# Patient Record
Sex: MALE
[De-identification: names, ages, dates, MRNs, and addresses within clinical notes are randomized per-mention and may not be internally consistent; named-entity substitution may affect disease eponyms.]

---

## 2020-09-15 ENCOUNTER — HOSPITAL ENCOUNTER (INPATIENT)
Dept: HOSPITAL 84 - D.ER | Age: 65
LOS: 14 days | Discharge: ANOTHER HOSPITAL | DRG: 871 | End: 2020-09-29
Attending: FAMILY MEDICINE | Admitting: FAMILY MEDICINE
Payer: COMMERCIAL

## 2020-09-15 VITALS — DIASTOLIC BLOOD PRESSURE: 85 MMHG | SYSTOLIC BLOOD PRESSURE: 124 MMHG

## 2020-09-15 VITALS — SYSTOLIC BLOOD PRESSURE: 144 MMHG | DIASTOLIC BLOOD PRESSURE: 82 MMHG

## 2020-09-15 VITALS — SYSTOLIC BLOOD PRESSURE: 131 MMHG | DIASTOLIC BLOOD PRESSURE: 78 MMHG

## 2020-09-15 VITALS — DIASTOLIC BLOOD PRESSURE: 75 MMHG | SYSTOLIC BLOOD PRESSURE: 125 MMHG

## 2020-09-15 VITALS — DIASTOLIC BLOOD PRESSURE: 82 MMHG | SYSTOLIC BLOOD PRESSURE: 133 MMHG

## 2020-09-15 VITALS — DIASTOLIC BLOOD PRESSURE: 84 MMHG | SYSTOLIC BLOOD PRESSURE: 138 MMHG

## 2020-09-15 VITALS — DIASTOLIC BLOOD PRESSURE: 85 MMHG | SYSTOLIC BLOOD PRESSURE: 133 MMHG

## 2020-09-15 VITALS
BODY MASS INDEX: 27.66 KG/M2 | BODY MASS INDEX: 27.66 KG/M2 | HEIGHT: 65 IN | HEIGHT: 65 IN | BODY MASS INDEX: 27.66 KG/M2 | BODY MASS INDEX: 27.66 KG/M2 | WEIGHT: 166 LBS | WEIGHT: 166 LBS

## 2020-09-15 VITALS — DIASTOLIC BLOOD PRESSURE: 86 MMHG | SYSTOLIC BLOOD PRESSURE: 128 MMHG

## 2020-09-15 DIAGNOSIS — D64.9: ICD-10-CM

## 2020-09-15 DIAGNOSIS — G93.41: ICD-10-CM

## 2020-09-15 DIAGNOSIS — U07.1: ICD-10-CM

## 2020-09-15 DIAGNOSIS — N40.0: ICD-10-CM

## 2020-09-15 DIAGNOSIS — I12.9: ICD-10-CM

## 2020-09-15 DIAGNOSIS — J45.909: ICD-10-CM

## 2020-09-15 DIAGNOSIS — E87.6: ICD-10-CM

## 2020-09-15 DIAGNOSIS — N39.0: ICD-10-CM

## 2020-09-15 DIAGNOSIS — A41.9: Primary | ICD-10-CM

## 2020-09-15 DIAGNOSIS — E86.0: ICD-10-CM

## 2020-09-15 DIAGNOSIS — N18.4: ICD-10-CM

## 2020-09-15 DIAGNOSIS — E87.1: ICD-10-CM

## 2020-09-15 DIAGNOSIS — E87.8: ICD-10-CM

## 2020-09-15 DIAGNOSIS — N13.9: ICD-10-CM

## 2020-09-15 DIAGNOSIS — R26.9: ICD-10-CM

## 2020-09-15 DIAGNOSIS — E87.2: ICD-10-CM

## 2020-09-15 DIAGNOSIS — E55.9: ICD-10-CM

## 2020-09-15 DIAGNOSIS — J18.9: ICD-10-CM

## 2020-09-15 DIAGNOSIS — D63.1: ICD-10-CM

## 2020-09-15 DIAGNOSIS — N17.0: ICD-10-CM

## 2020-09-15 DIAGNOSIS — R32: ICD-10-CM

## 2020-09-15 DIAGNOSIS — N13.30: ICD-10-CM

## 2020-09-15 LAB
ALBUMIN SERPL-MCNC: 2.5 G/DL (ref 3.4–5)
ALBUMIN SERPL-MCNC: 3.3 G/DL (ref 3.4–5)
ALP SERPL-CCNC: 102 U/L (ref 30–120)
ALP SERPL-CCNC: 77 U/L (ref 30–120)
ALT SERPL-CCNC: 20 U/L (ref 10–68)
ALT SERPL-CCNC: 28 U/L (ref 10–68)
AMORPHOUS SEDIMENT: (no result) /LPF
ANION GAP SERPL CALC-SCNC: 20.8 MMOL/L (ref 8–16)
ANION GAP SERPL CALC-SCNC: 22 MMOL/L (ref 8–16)
ANION GAP SERPL CALC-SCNC: 26.3 MMOL/L (ref 8–16)
APTT BLD: 34.1 SECONDS (ref 22.8–39.4)
BACTERIA #/AREA URNS HPF: (no result) /HPF
BASOPHILS NFR BLD AUTO: 0.1 % (ref 0–2)
BILIRUB SERPL-MCNC: 0.44 MG/DL (ref 0.2–1.3)
BILIRUB SERPL-MCNC: 0.57 MG/DL (ref 0.2–1.3)
BILIRUB SERPL-MCNC: NEGATIVE MG/DL
BUN SERPL-MCNC: 182 MG/DL (ref 7–18)
BUN SERPL-MCNC: 182 MG/DL (ref 7–18)
BUN SERPL-MCNC: 202 MG/DL (ref 7–18)
CALCIUM SERPL-MCNC: 7.6 MG/DL (ref 8.5–10.1)
CALCIUM SERPL-MCNC: 7.7 MG/DL (ref 8.5–10.1)
CALCIUM SERPL-MCNC: 8.4 MG/DL (ref 8.5–10.1)
CHLORIDE SERPL-SCNC: 101 MMOL/L (ref 98–107)
CHLORIDE SERPL-SCNC: 106 MMOL/L (ref 98–107)
CHLORIDE SERPL-SCNC: 106 MMOL/L (ref 98–107)
CK MB SERPL-MCNC: 10.7 U/L (ref 0–3.6)
CK SERPL-CCNC: 222 UL (ref 21–232)
CO2 SERPL-SCNC: 15.5 MMOL/L (ref 21–32)
CO2 SERPL-SCNC: 9.2 MMOL/L (ref 21–32)
CO2 SERPL-SCNC: 9.2 MMOL/L (ref 21–32)
CREAT SERPL-MCNC: 12.7 MG/DL (ref 0.6–1.3)
CREAT SERPL-MCNC: 13.3 MG/DL (ref 0.6–1.3)
CREAT SERPL-MCNC: 15.5 MG/DL (ref 0.6–1.3)
EOSINOPHIL NFR BLD: 0.5 % (ref 0–7)
ERYTHROCYTE [DISTWIDTH] IN BLOOD BY AUTOMATED COUNT: 13 % (ref 11.5–14.5)
GLOBULIN SER-MCNC: 4.1 G/L
GLOBULIN SER-MCNC: 5.3 G/L
GLUCOSE SERPL-MCNC: 102 MG/DL (ref 74–106)
GLUCOSE SERPL-MCNC: 117 MG/DL (ref 74–106)
GLUCOSE SERPL-MCNC: 92 MG/DL (ref 74–106)
HCT VFR BLD CALC: 34.2 % (ref 42–54)
HGB BLD-MCNC: 11.2 G/DL (ref 13.5–17.5)
IMM GRANULOCYTES NFR BLD: 0.4 % (ref 0–5)
INR PPP: 1.22 (ref 0.85–1.17)
KETONES UR STRIP-MCNC: NEGATIVE MG/DL
LYMPHOCYTES NFR BLD AUTO: 5.9 % (ref 15–50)
MCH RBC QN AUTO: 29.6 PG (ref 26–34)
MCHC RBC AUTO-ENTMCNC: 32.7 G/DL (ref 31–37)
MCV RBC: 90.5 FL (ref 80–100)
MONOCYTES NFR BLD: 9.2 % (ref 2–11)
NEUTROPHILS NFR BLD AUTO: 83.9 % (ref 40–80)
NITRITE UR-MCNC: NEGATIVE MG/ML
NT-PROBNP SERPL-MCNC: 868 PG/ML (ref 0–125)
OSMOLALITY SERPL CALC.SUM OF ELEC: 326 MOSM/KG (ref 275–300)
OSMOLALITY SERPL CALC.SUM OF ELEC: 333 MOSM/KG (ref 275–300)
OSMOLALITY SERPL CALC.SUM OF ELEC: 338 MOSM/KG (ref 275–300)
PH UR STRIP: 8 [PH] (ref 5–8)
PLATELET # BLD: 267 10X3/UL (ref 130–400)
PMV BLD AUTO: 10.2 FL (ref 7.4–10.4)
POTASSIUM SERPL-SCNC: 3.3 MMOL/L (ref 3.5–5.1)
POTASSIUM SERPL-SCNC: 4.2 MMOL/L (ref 3.5–5.1)
POTASSIUM SERPL-SCNC: 4.5 MMOL/L (ref 3.5–5.1)
PROT SERPL-MCNC: 6.6 G/DL (ref 6.4–8.2)
PROT SERPL-MCNC: 8.6 G/DL (ref 6.4–8.2)
PROTHROMBIN TIME: 15.3 SECONDS (ref 11.6–15)
RBC # BLD AUTO: 3.78 10X6/UL (ref 4.2–6.1)
SODIUM SERPL-SCNC: 132 MMOL/L (ref 136–145)
SODIUM SERPL-SCNC: 133 MMOL/L (ref 136–145)
SODIUM SERPL-SCNC: 139 MMOL/L (ref 136–145)
SQUAMOUS #/AREA URNS HPF: (no result) /HPF (ref 0–5)
TROPONIN I SERPL-MCNC: 0.05 NG/ML (ref 0–0.06)
UROBILINOGEN UR-MCNC: NORMAL MG/DL (ref ?–2)
WBC # BLD AUTO: 7.5 10X3/UL (ref 4.8–10.8)
WBC #/AREA URNS HPF: (no result) HPF (ref 0–1)

## 2020-09-15 NOTE — NUR
pt provided a urinal and informed that we needed to get a urine sample, pt
states that he can not give a urine at this time, Dr. Street updated

## 2020-09-15 NOTE — NUR
ARRIVE TO ROOM VIA BED FROM ER. ALERT AND ORIENTED X4. QUIROGA DRAINING BY
GRAVITY. VITALS STABLE. SINUS TACH ON TELEMETRY.  ARRIVES TO ROOM.
CONTINUE ADMISSION PROCESS AND SAFETY PRECAUTIONS. SCDs ON.

## 2020-09-15 NOTE — NUR
REPORT RECEIVED, WILL CONT POC. PT A&O, UP IN BED RESTING. NO S/S OF DISTRESS
OBSERVED. RR EVEN & UNLABORED AND O2 SAT 97% ON 2L VIA NC. PT REQUESTS WATER
AND JELLO, BROUGHT TO BEDSIDE. PT DENIES FURTER NEEDS AT THIS TIME. BED LOCKED
AND LOWERED, CALL LIGHT IN REACH. INSTRUCTED PT TO CALL NURSES STATION IF PT
FEELS SOB OR HAS DIFFICULTY BREATHING. WROTE NURSES STATION NUMBER ON BOARD.
VERIFIED PTS UNDERSTANDING OF SYMPTOMS TO REPORT VIA TEACHBACK METHOD.
ASSESSMENT COMPLETED AT THIS TIME. WILL CONT TO MONITOR.

## 2020-09-16 VITALS — DIASTOLIC BLOOD PRESSURE: 79 MMHG | SYSTOLIC BLOOD PRESSURE: 129 MMHG

## 2020-09-16 VITALS — SYSTOLIC BLOOD PRESSURE: 128 MMHG | DIASTOLIC BLOOD PRESSURE: 87 MMHG

## 2020-09-16 VITALS — DIASTOLIC BLOOD PRESSURE: 86 MMHG | SYSTOLIC BLOOD PRESSURE: 129 MMHG

## 2020-09-16 VITALS — SYSTOLIC BLOOD PRESSURE: 133 MMHG | DIASTOLIC BLOOD PRESSURE: 86 MMHG

## 2020-09-16 VITALS — DIASTOLIC BLOOD PRESSURE: 80 MMHG | SYSTOLIC BLOOD PRESSURE: 133 MMHG

## 2020-09-16 VITALS — DIASTOLIC BLOOD PRESSURE: 100 MMHG | SYSTOLIC BLOOD PRESSURE: 148 MMHG

## 2020-09-16 VITALS — SYSTOLIC BLOOD PRESSURE: 127 MMHG | DIASTOLIC BLOOD PRESSURE: 88 MMHG

## 2020-09-16 VITALS — SYSTOLIC BLOOD PRESSURE: 131 MMHG | DIASTOLIC BLOOD PRESSURE: 93 MMHG

## 2020-09-16 VITALS — SYSTOLIC BLOOD PRESSURE: 136 MMHG | DIASTOLIC BLOOD PRESSURE: 79 MMHG

## 2020-09-16 VITALS — SYSTOLIC BLOOD PRESSURE: 142 MMHG | DIASTOLIC BLOOD PRESSURE: 93 MMHG

## 2020-09-16 VITALS — SYSTOLIC BLOOD PRESSURE: 133 MMHG | DIASTOLIC BLOOD PRESSURE: 84 MMHG

## 2020-09-16 VITALS — DIASTOLIC BLOOD PRESSURE: 88 MMHG | SYSTOLIC BLOOD PRESSURE: 130 MMHG

## 2020-09-16 VITALS — SYSTOLIC BLOOD PRESSURE: 127 MMHG | DIASTOLIC BLOOD PRESSURE: 90 MMHG

## 2020-09-16 LAB
ANION GAP SERPL CALC-SCNC: 18 MMOL/L (ref 8–16)
BASOPHILS NFR BLD AUTO: 0.2 % (ref 0–2)
BUN SERPL-MCNC: 178 MG/DL (ref 7–18)
CALCIUM SERPL-MCNC: 7.5 MG/DL (ref 8.5–10.1)
CHLORIDE SERPL-SCNC: 105 MMOL/L (ref 98–107)
CK MB SERPL-MCNC: 8.6 U/L (ref 0–3.6)
CK SERPL-CCNC: 219 UL (ref 21–232)
CO2 SERPL-SCNC: 17.1 MMOL/L (ref 21–32)
CREAT SERPL-MCNC: 12.2 MG/DL (ref 0.6–1.3)
CRP SERPL-MCNC: 3 MG/DL (ref 0–0.9)
EOSINOPHIL NFR BLD: 0.6 % (ref 0–7)
ERYTHROCYTE [DISTWIDTH] IN BLOOD BY AUTOMATED COUNT: 12.7 % (ref 11.5–14.5)
FERRITIN SERPL-MCNC: 1615 NG/ML (ref 3–244)
GLUCOSE SERPL-MCNC: 128 MG/DL (ref 74–106)
HCT VFR BLD CALC: 28.8 % (ref 42–54)
HGB BLD-MCNC: 9.9 G/DL (ref 13.5–17.5)
IMM GRANULOCYTES NFR BLD: 0.4 % (ref 0–5)
LDH1 SERPL-CCNC: 200 U/L (ref 85–227)
LYMPHOCYTES NFR BLD AUTO: 11.2 % (ref 15–50)
MAGNESIUM SERPL-MCNC: 1.7 MG/DL (ref 1.8–2.4)
MCH RBC QN AUTO: 30.1 PG (ref 26–34)
MCHC RBC AUTO-ENTMCNC: 34.4 G/DL (ref 31–37)
MCV RBC: 87.5 FL (ref 80–100)
MONOCYTES NFR BLD: 12.6 % (ref 2–11)
NEUTROPHILS NFR BLD AUTO: 75 % (ref 40–80)
OSMOLALITY SERPL CALC.SUM OF ELEC: 334 MOSM/KG (ref 275–300)
PHOSPHATE SERPL-MCNC: 7.3 MG/DL (ref 2.5–4.9)
PLATELET # BLD: 210 10X3/UL (ref 130–400)
PMV BLD AUTO: 9.6 FL (ref 7.4–10.4)
POTASSIUM SERPL-SCNC: 3.1 MMOL/L (ref 3.5–5.1)
RBC # BLD AUTO: 3.29 10X6/UL (ref 4.2–6.1)
SODIUM SERPL-SCNC: 137 MMOL/L (ref 136–145)
TROPONIN I SERPL-MCNC: 0.06 NG/ML (ref 0–0.06)
WBC # BLD AUTO: 5.3 10X3/UL (ref 4.8–10.8)

## 2020-09-16 PROCEDURE — 05HM33Z INSERTION OF INFUSION DEVICE INTO RIGHT INTERNAL JUGULAR VEIN, PERCUTANEOUS APPROACH: ICD-10-PCS | Performed by: SURGERY

## 2020-09-16 NOTE — NUR
PT REPORT RECEIVED FROM NIGHT SHIFT NURSE. NO ACUTE SIGNS OF DISTRESS NOTED.
SHIFT ASSESSMENT COMPLETED. WILL CONTINUE TO MONITOR

## 2020-09-16 NOTE — NUR
REPORT RECEIVED. PT SITTING IN BED, NO ACUTE DISTRESS NOTED. PT ON 3L NC,
ASSESSMENT COMPLETED, SEE FLOWSHEET. RT TRIALYSIS IJ INFUSING, SEE IV
FLOWSHEET. WILL CONTINUE TO MONITOR.

## 2020-09-17 VITALS — SYSTOLIC BLOOD PRESSURE: 129 MMHG | DIASTOLIC BLOOD PRESSURE: 80 MMHG

## 2020-09-17 VITALS — SYSTOLIC BLOOD PRESSURE: 136 MMHG | DIASTOLIC BLOOD PRESSURE: 95 MMHG

## 2020-09-17 VITALS — SYSTOLIC BLOOD PRESSURE: 120 MMHG | DIASTOLIC BLOOD PRESSURE: 80 MMHG

## 2020-09-17 VITALS — SYSTOLIC BLOOD PRESSURE: 129 MMHG | DIASTOLIC BLOOD PRESSURE: 82 MMHG

## 2020-09-17 VITALS — DIASTOLIC BLOOD PRESSURE: 73 MMHG | SYSTOLIC BLOOD PRESSURE: 121 MMHG

## 2020-09-17 VITALS — SYSTOLIC BLOOD PRESSURE: 125 MMHG | DIASTOLIC BLOOD PRESSURE: 83 MMHG

## 2020-09-17 VITALS — DIASTOLIC BLOOD PRESSURE: 89 MMHG | SYSTOLIC BLOOD PRESSURE: 133 MMHG

## 2020-09-17 VITALS — DIASTOLIC BLOOD PRESSURE: 86 MMHG | SYSTOLIC BLOOD PRESSURE: 132 MMHG

## 2020-09-17 VITALS — DIASTOLIC BLOOD PRESSURE: 77 MMHG | SYSTOLIC BLOOD PRESSURE: 119 MMHG

## 2020-09-17 VITALS — SYSTOLIC BLOOD PRESSURE: 152 MMHG | DIASTOLIC BLOOD PRESSURE: 92 MMHG

## 2020-09-17 VITALS — SYSTOLIC BLOOD PRESSURE: 120 MMHG | DIASTOLIC BLOOD PRESSURE: 75 MMHG

## 2020-09-17 VITALS — SYSTOLIC BLOOD PRESSURE: 122 MMHG | DIASTOLIC BLOOD PRESSURE: 83 MMHG

## 2020-09-17 VITALS — DIASTOLIC BLOOD PRESSURE: 80 MMHG | SYSTOLIC BLOOD PRESSURE: 119 MMHG

## 2020-09-17 VITALS — SYSTOLIC BLOOD PRESSURE: 154 MMHG | DIASTOLIC BLOOD PRESSURE: 93 MMHG

## 2020-09-17 VITALS — DIASTOLIC BLOOD PRESSURE: 77 MMHG | SYSTOLIC BLOOD PRESSURE: 136 MMHG

## 2020-09-17 VITALS — SYSTOLIC BLOOD PRESSURE: 151 MMHG | DIASTOLIC BLOOD PRESSURE: 90 MMHG

## 2020-09-17 VITALS — DIASTOLIC BLOOD PRESSURE: 80 MMHG | SYSTOLIC BLOOD PRESSURE: 126 MMHG

## 2020-09-17 VITALS — DIASTOLIC BLOOD PRESSURE: 95 MMHG | SYSTOLIC BLOOD PRESSURE: 149 MMHG

## 2020-09-17 VITALS — DIASTOLIC BLOOD PRESSURE: 90 MMHG | SYSTOLIC BLOOD PRESSURE: 131 MMHG

## 2020-09-17 LAB
ALBUMIN SERPL-MCNC: 2.6 G/DL (ref 3.4–5)
ALP SERPL-CCNC: 76 U/L (ref 30–120)
ALT SERPL-CCNC: 19 U/L (ref 10–68)
ANION GAP SERPL CALC-SCNC: 10 MMOL/L (ref 8–16)
ANION GAP SERPL CALC-SCNC: 7.7 MMOL/L (ref 8–16)
BASOPHILS NFR BLD AUTO: 0 % (ref 0–2)
BILIRUB SERPL-MCNC: 0.52 MG/DL (ref 0.2–1.3)
BUN SERPL-MCNC: 72 MG/DL (ref 7–18)
BUN SERPL-MCNC: 77 MG/DL (ref 7–18)
CALCIUM SERPL-MCNC: 7.1 MG/DL (ref 8.5–10.1)
CALCIUM SERPL-MCNC: 7.2 MG/DL (ref 8.5–10.1)
CHLORIDE SERPL-SCNC: 100 MMOL/L (ref 98–107)
CHLORIDE SERPL-SCNC: 97 MMOL/L (ref 98–107)
CK MB SERPL-MCNC: 4.1 U/L (ref 0–3.6)
CK MB SERPL-MCNC: 4.5 U/L (ref 0–3.6)
CK SERPL-CCNC: 261 UL (ref 21–232)
CK SERPL-CCNC: 285 UL (ref 21–232)
CO2 SERPL-SCNC: 33.6 MMOL/L (ref 21–32)
CO2 SERPL-SCNC: 38.1 MMOL/L (ref 21–32)
CREAT SERPL-MCNC: 6.3 MG/DL (ref 0.6–1.3)
CREAT SERPL-MCNC: 6.5 MG/DL (ref 0.6–1.3)
EOSINOPHIL NFR BLD: 0.1 % (ref 0–7)
ERYTHROCYTE [DISTWIDTH] IN BLOOD BY AUTOMATED COUNT: 12.4 % (ref 11.5–14.5)
GLOBULIN SER-MCNC: 4.2 G/L
GLUCOSE SERPL-MCNC: 135 MG/DL (ref 74–106)
GLUCOSE SERPL-MCNC: 185 MG/DL (ref 74–106)
HCT VFR BLD CALC: 27.4 % (ref 42–54)
HGB BLD-MCNC: 9.1 G/DL (ref 13.5–17.5)
IMM GRANULOCYTES NFR BLD: 0.3 % (ref 0–5)
LYMPHOCYTES NFR BLD AUTO: 13.4 % (ref 15–50)
MAGNESIUM SERPL-MCNC: 1.7 MG/DL (ref 1.8–2.4)
MCH RBC QN AUTO: 29.5 PG (ref 26–34)
MCHC RBC AUTO-ENTMCNC: 33.2 G/DL (ref 31–37)
MCV RBC: 89 FL (ref 80–100)
MONOCYTES NFR BLD: 4.2 % (ref 2–11)
NEUTROPHILS NFR BLD AUTO: 82 % (ref 40–80)
OSMOLALITY SERPL CALC.SUM OF ELEC: 304 MOSM/KG (ref 275–300)
OSMOLALITY SERPL CALC.SUM OF ELEC: 305 MOSM/KG (ref 275–300)
PHOSPHATE SERPL-MCNC: 4.1 MG/DL (ref 2.5–4.9)
PLATELET # BLD: 174 10X3/UL (ref 130–400)
PMV BLD AUTO: 10.3 FL (ref 7.4–10.4)
POTASSIUM SERPL-SCNC: 2.6 MMOL/L (ref 3.5–5.1)
POTASSIUM SERPL-SCNC: 2.8 MMOL/L (ref 3.5–5.1)
PROT SERPL-MCNC: 6.8 G/DL (ref 6.4–8.2)
RBC # BLD AUTO: 3.08 10X6/UL (ref 4.2–6.1)
SODIUM SERPL-SCNC: 140 MMOL/L (ref 136–145)
SODIUM SERPL-SCNC: 141 MMOL/L (ref 136–145)
TROPONIN I SERPL-MCNC: 0.04 NG/ML (ref 0–0.06)
TROPONIN I SERPL-MCNC: 0.04 NG/ML (ref 0–0.06)
WBC # BLD AUTO: 7.2 10X3/UL (ref 4.8–10.8)

## 2020-09-17 NOTE — NUR
RECEIVED PATIENT TO ROOM 2140 VIA BED. PATIENT IS AAOX4, LYING IN SEMI-FOWLERS
POSITION. TRIALYSIS TO RT IJ, PATENT, INFUSING LR @ 100ML/HR. NO S/S OF
DISTRESS OBSERVED, RR EVEN AND UNLABORED ON 1L O2 VIA NC. F/C PATENT, DRAINING
YELLOW URINE BY GRAVITY TO RT SIDE OF BED. PATIENT DENIES NEEDS AT THIS TIME.
CL IN REACH, BED LOCKED AND LOWERED. COVID DROPET PRECAUTIONS MAINTAINED. WILL
CTM.

## 2020-09-18 VITALS — SYSTOLIC BLOOD PRESSURE: 124 MMHG | DIASTOLIC BLOOD PRESSURE: 81 MMHG

## 2020-09-18 VITALS — SYSTOLIC BLOOD PRESSURE: 125 MMHG | DIASTOLIC BLOOD PRESSURE: 79 MMHG

## 2020-09-18 VITALS — DIASTOLIC BLOOD PRESSURE: 80 MMHG | SYSTOLIC BLOOD PRESSURE: 131 MMHG

## 2020-09-18 VITALS — DIASTOLIC BLOOD PRESSURE: 89 MMHG | SYSTOLIC BLOOD PRESSURE: 132 MMHG

## 2020-09-18 VITALS — DIASTOLIC BLOOD PRESSURE: 83 MMHG | SYSTOLIC BLOOD PRESSURE: 122 MMHG

## 2020-09-18 LAB
ALBUMIN SERPL-MCNC: 2.5 G/DL (ref 3.4–5)
ALP SERPL-CCNC: 73 U/L (ref 30–120)
ALT SERPL-CCNC: 18 U/L (ref 10–68)
ANION GAP SERPL CALC-SCNC: 12.2 MMOL/L (ref 8–16)
BASOPHILS NFR BLD AUTO: 0.1 % (ref 0–2)
BILIRUB SERPL-MCNC: 0.38 MG/DL (ref 0.2–1.3)
BUN SERPL-MCNC: 67 MG/DL (ref 7–18)
CALCIUM SERPL-MCNC: 7.5 MG/DL (ref 8.5–10.1)
CHLORIDE SERPL-SCNC: 100 MMOL/L (ref 98–107)
CO2 SERPL-SCNC: 30.9 MMOL/L (ref 21–32)
CREAT SERPL-MCNC: 6.1 MG/DL (ref 0.6–1.3)
EOSINOPHIL NFR BLD: 0.1 % (ref 0–7)
ERYTHROCYTE [DISTWIDTH] IN BLOOD BY AUTOMATED COUNT: 12.3 % (ref 11.5–14.5)
GLOBULIN SER-MCNC: 4.1 G/L
GLUCOSE SERPL-MCNC: 104 MG/DL (ref 74–106)
HCT VFR BLD CALC: 27.8 % (ref 42–54)
HCV AB S/CO SERPL IA: 0.2 S/CO RAT (ref 0–0.9)
HGB BLD-MCNC: 8.8 G/DL (ref 13.5–17.5)
IMM GRANULOCYTES NFR BLD: 0.4 % (ref 0–5)
LYMPHOCYTES NFR BLD AUTO: 11.4 % (ref 15–50)
MAGNESIUM SERPL-MCNC: 2 MG/DL (ref 1.8–2.4)
MCH RBC QN AUTO: 29.5 PG (ref 26–34)
MCHC RBC AUTO-ENTMCNC: 31.7 G/DL (ref 31–37)
MCV RBC: 93.3 FL (ref 80–100)
MONOCYTES NFR BLD: 2.5 % (ref 2–11)
NEUTROPHILS NFR BLD AUTO: 85.5 % (ref 40–80)
OSMOLALITY SERPL CALC.SUM OF ELEC: 297 MOSM/KG (ref 275–300)
PHOSPHATE SERPL-MCNC: 5.3 MG/DL (ref 2.5–4.9)
PLATELET # BLD: 165 10X3/UL (ref 130–400)
PMV BLD AUTO: 10.2 FL (ref 7.4–10.4)
POTASSIUM SERPL-SCNC: 3.1 MMOL/L (ref 3.5–5.1)
PROT SERPL-MCNC: 6.6 G/DL (ref 6.4–8.2)
RBC # BLD AUTO: 2.98 10X6/UL (ref 4.2–6.1)
SODIUM SERPL-SCNC: 140 MMOL/L (ref 136–145)
WBC # BLD AUTO: 11.1 10X3/UL (ref 4.8–10.8)

## 2020-09-18 NOTE — NUR
RECEIVE SHIFT REPORT. RESTING IN BED WITH EYES CLOSED. NO SIGNS OF DISTRESS.
WILL CONTINUE PLAN OF CARE AND SAFETY PRECAUTIONS. MONITOR PLACED AT DESK FOR
SAFETY.

## 2020-09-18 NOTE — NUR
NUTRITION FOLLOW UP:
 
COMMENTS: Patient interview deferred due to COVID-19. Patient has had a PO
intake of 0% for 3 meals recorded. Patient has been experiencing a
poor appetite and decreased sense of taste and smell per MD note.
 
DIET: Renal Diet
PO INTAKE: 0% x 3 meals
WEIGHT: 9/16- 166 lbs
BM: None since admit
 
SIG MEDS: Zinc Sulfate, Pepcid, Lovenox, Vit D, Vit C, Zofran, LR @ 100 ml/hr
SIG LABS: K-3.1(L), GFR-10(L), BUN-67(H), Cr-6.1(H), Calcium-7.5(L),
Phos-5.3(H), Albumin-2.5(L), Vit D-12.9(L),
 
RECOMMENDATIONS:
Continue Renal Diet as tolerated
Encourage PO Intake
Assistance with meals as needed
Frequent breaks during meals if difficulty breathing
 
RD to continue to monitor and follow patient DHS

## 2020-09-19 VITALS — SYSTOLIC BLOOD PRESSURE: 136 MMHG | DIASTOLIC BLOOD PRESSURE: 82 MMHG

## 2020-09-19 VITALS — SYSTOLIC BLOOD PRESSURE: 113 MMHG | DIASTOLIC BLOOD PRESSURE: 82 MMHG

## 2020-09-19 VITALS — DIASTOLIC BLOOD PRESSURE: 84 MMHG | SYSTOLIC BLOOD PRESSURE: 123 MMHG

## 2020-09-19 VITALS — SYSTOLIC BLOOD PRESSURE: 120 MMHG | DIASTOLIC BLOOD PRESSURE: 80 MMHG

## 2020-09-19 LAB
AMORPHOUS SEDIMENT: (no result) /LPF
ANION GAP SERPL CALC-SCNC: 12.5 MMOL/L (ref 8–16)
BACTERIA #/AREA URNS HPF: (no result) HPF
BASOPHILS NFR BLD AUTO: 0 % (ref 0–2)
BILIRUB SERPL-MCNC: NEGATIVE MG/DL
BUN SERPL-MCNC: 62 MG/DL (ref 7–18)
CALCIUM SERPL-MCNC: 7.9 MG/DL (ref 8.5–10.1)
CHLORIDE SERPL-SCNC: 102 MMOL/L (ref 98–107)
CO2 SERPL-SCNC: 28.8 MMOL/L (ref 21–32)
CREAT SERPL-MCNC: 6.1 MG/DL (ref 0.6–1.3)
EOSINOPHIL NFR BLD: 0.2 % (ref 0–7)
ERYTHROCYTE [DISTWIDTH] IN BLOOD BY AUTOMATED COUNT: 12.4 % (ref 11.5–14.5)
GLUCOSE SERPL-MCNC: 88 MG/DL (ref 74–106)
HCT VFR BLD CALC: 29.9 % (ref 42–54)
HGB BLD-MCNC: 9.1 G/DL (ref 13.5–17.5)
IMM GRANULOCYTES NFR BLD: 0.4 % (ref 0–5)
KETONES UR STRIP-MCNC: NEGATIVE MG/DL
LYMPHOCYTES NFR BLD AUTO: 5.6 % (ref 15–50)
MAGNESIUM SERPL-MCNC: 1.8 MG/DL (ref 1.8–2.4)
MCH RBC QN AUTO: 29.4 PG (ref 26–34)
MCHC RBC AUTO-ENTMCNC: 30.4 G/DL (ref 31–37)
MCV RBC: 96.5 FL (ref 80–100)
MONOCYTES NFR BLD: 8.7 % (ref 2–11)
NEUTROPHILS NFR BLD AUTO: 85.1 % (ref 40–80)
NITRITE UR-MCNC: NEGATIVE MG/ML
OSMOLALITY SERPL CALC.SUM OF ELEC: 295 MOSM/KG (ref 275–300)
PH UR STRIP: 8 [PH] (ref 5–8)
PHOSPHATE SERPL-MCNC: 5.6 MG/DL (ref 2.5–4.9)
PLATELET # BLD: 199 10X3/UL (ref 130–400)
PMV BLD AUTO: 10.5 FL (ref 7.4–10.4)
POTASSIUM SERPL-SCNC: 3.3 MMOL/L (ref 3.5–5.1)
RBC # BLD AUTO: 3.1 10X6/UL (ref 4.2–6.1)
SODIUM SERPL-SCNC: 140 MMOL/L (ref 136–145)
UROBILINOGEN UR-MCNC: NORMAL MG/DL (ref ?–2)
WBC # BLD AUTO: 13.7 10X3/UL (ref 4.8–10.8)
WBC #/AREA URNS HPF: (no result) HPF (ref 0–1)

## 2020-09-19 NOTE — NUR
PT AWAKE AND ORIENTED, EASILY FALLS BACK TO SLEEP. NO COMPLAINTS OR CONCERNS,
TOOK MEDIATIONS WITHOUT COMPLCIATIONS. CL NREACH, SRX2.

## 2020-09-20 VITALS — DIASTOLIC BLOOD PRESSURE: 65 MMHG | SYSTOLIC BLOOD PRESSURE: 121 MMHG

## 2020-09-20 VITALS — SYSTOLIC BLOOD PRESSURE: 113 MMHG | DIASTOLIC BLOOD PRESSURE: 75 MMHG

## 2020-09-20 VITALS — DIASTOLIC BLOOD PRESSURE: 84 MMHG | SYSTOLIC BLOOD PRESSURE: 125 MMHG

## 2020-09-20 VITALS — DIASTOLIC BLOOD PRESSURE: 75 MMHG | SYSTOLIC BLOOD PRESSURE: 111 MMHG

## 2020-09-20 VITALS — DIASTOLIC BLOOD PRESSURE: 69 MMHG | SYSTOLIC BLOOD PRESSURE: 107 MMHG

## 2020-09-20 LAB
ANION GAP SERPL CALC-SCNC: 9.2 MMOL/L (ref 8–16)
BASOPHILS NFR BLD AUTO: 0 % (ref 0–2)
BUN SERPL-MCNC: 37 MG/DL (ref 7–18)
CALCIUM SERPL-MCNC: 7.5 MG/DL (ref 8.5–10.1)
CHLORIDE SERPL-SCNC: 104 MMOL/L (ref 98–107)
CO2 SERPL-SCNC: 27.8 MMOL/L (ref 21–32)
CREAT SERPL-MCNC: 4.4 MG/DL (ref 0.6–1.3)
EOSINOPHIL NFR BLD: 3.8 % (ref 0–7)
ERYTHROCYTE [DISTWIDTH] IN BLOOD BY AUTOMATED COUNT: 12.2 % (ref 11.5–14.5)
GLUCOSE SERPL-MCNC: 86 MG/DL (ref 74–106)
HCT VFR BLD CALC: 27.8 % (ref 42–54)
HGB BLD-MCNC: 8.6 G/DL (ref 13.5–17.5)
IMM GRANULOCYTES NFR BLD: 0.5 % (ref 0–5)
LYMPHOCYTES NFR BLD AUTO: 8.4 % (ref 15–50)
MAGNESIUM SERPL-MCNC: 1.6 MG/DL (ref 1.8–2.4)
MCH RBC QN AUTO: 29.9 PG (ref 26–34)
MCHC RBC AUTO-ENTMCNC: 30.9 G/DL (ref 31–37)
MCV RBC: 96.5 FL (ref 80–100)
MONOCYTES NFR BLD: 10.4 % (ref 2–11)
NEUTROPHILS NFR BLD AUTO: 76.9 % (ref 40–80)
OSMOLALITY SERPL CALC.SUM OF ELEC: 283 MOSM/KG (ref 275–300)
PHOSPHATE SERPL-MCNC: 4.6 MG/DL (ref 2.5–4.9)
PLATELET # BLD: 148 10X3/UL (ref 130–400)
PMV BLD AUTO: 10.5 FL (ref 7.4–10.4)
POTASSIUM SERPL-SCNC: 3 MMOL/L (ref 3.5–5.1)
RBC # BLD AUTO: 2.88 10X6/UL (ref 4.2–6.1)
SODIUM SERPL-SCNC: 138 MMOL/L (ref 136–145)
WBC # BLD AUTO: 10.9 10X3/UL (ref 4.8–10.8)

## 2020-09-20 NOTE — NUR
PT AWAKE AND ORIENTED THROUGHOUT DAY. NOTED LESS LETHARGY THAN PREVIUOS DAYS.
INCREASED APPITITE. 800ML OUT IN QUIROGA. ABLE TO MOVE SELF UP AND DOWN IN BED
WIHTOUT TOO MUCH DIFFICULTY. COLLECTED SECOND COVID SWAB. PT REFUSED TO TAKE
OFF 1L, THOUGH HE CAN TOLERATE ROOM AIR WITHOUT DSATING. WILL CNT. TO MONITOR.
CL IN REACH, SRX2.

## 2020-09-21 VITALS — DIASTOLIC BLOOD PRESSURE: 84 MMHG | SYSTOLIC BLOOD PRESSURE: 122 MMHG

## 2020-09-21 VITALS — SYSTOLIC BLOOD PRESSURE: 123 MMHG | DIASTOLIC BLOOD PRESSURE: 79 MMHG

## 2020-09-21 LAB
ANION GAP SERPL CALC-SCNC: 12 MMOL/L (ref 8–16)
BASOPHILS NFR BLD AUTO: 0.1 % (ref 0–2)
BUN SERPL-MCNC: 44 MG/DL (ref 7–18)
CALCIUM SERPL-MCNC: 7.7 MG/DL (ref 8.5–10.1)
CHLORIDE SERPL-SCNC: 107 MMOL/L (ref 98–107)
CO2 SERPL-SCNC: 24.2 MMOL/L (ref 21–32)
CREAT SERPL-MCNC: 4.8 MG/DL (ref 0.6–1.3)
EOSINOPHIL NFR BLD: 0.5 % (ref 0–7)
ERYTHROCYTE [DISTWIDTH] IN BLOOD BY AUTOMATED COUNT: 12 % (ref 11.5–14.5)
FERRITIN SERPL-MCNC: 1466 NG/ML (ref 3–244)
GLUCOSE SERPL-MCNC: 100 MG/DL (ref 74–106)
HCT VFR BLD CALC: 27.4 % (ref 42–54)
HGB BLD-MCNC: 8.5 G/DL (ref 13.5–17.5)
IMM GRANULOCYTES NFR BLD: 0.6 % (ref 0–5)
IRON SERPL-MCNC: 64 UG/DL (ref 35–150)
LYMPHOCYTES NFR BLD AUTO: 5 % (ref 15–50)
MAGNESIUM SERPL-MCNC: 1.6 MG/DL (ref 1.8–2.4)
MCH RBC QN AUTO: 29.8 PG (ref 26–34)
MCHC RBC AUTO-ENTMCNC: 31 G/DL (ref 31–37)
MCV RBC: 96.1 FL (ref 80–100)
MONOCYTES NFR BLD: 6.1 % (ref 2–11)
NEUTROPHILS NFR BLD AUTO: 87.7 % (ref 40–80)
OSMOLALITY SERPL CALC.SUM OF ELEC: 289 MOSM/KG (ref 275–300)
PHOSPHATE SERPL-MCNC: 4.4 MG/DL (ref 2.5–4.9)
PLATELET # BLD: 159 10X3/UL (ref 130–400)
PMV BLD AUTO: 10.4 FL (ref 7.4–10.4)
POTASSIUM SERPL-SCNC: 3.2 MMOL/L (ref 3.5–5.1)
RBC # BLD AUTO: 2.85 10X6/UL (ref 4.2–6.1)
SAO2 % BLD FROM PO2: 55 % (ref 15–55)
SODIUM SERPL-SCNC: 140 MMOL/L (ref 136–145)
TIBC SERPL-MCNC: 115 UG/DL (ref 260–445)
UIBC SERPL-MCNC: 51 UG/DL (ref 150–375)
WBC # BLD AUTO: 16.3 10X3/UL (ref 4.8–10.8)

## 2020-09-21 NOTE — MORECARE
CASE MANAGEMENT DISCHARGE SUMMARY
 
 
PATIENT: NAWAF EISENBERG                    UNIT: [id number]
ACCOUNT#: [id number]                       ADM DATE: 09/15/20
AGE: 65     : 51  SEX: M            ROOM/BED: D.2140    
AUTHOR: KATYADOC                             PHYSICIAN:                               
 
REFERRING PHYSICIAN: ABDIEL PETERSON MD            
DATE OF SERVICE: 20
Discharge Plan
 
 
Patient Name: NAWAF EISENBERG
Facility: [facility]:[place]
Encounter #: [id number]
Medical Record #: [id number]
: 1955
Planned Disposition: Home or Self Care
Anticipated Discharge Date: 
 
Discharge Date: 
Expected LOS: 
Initial Reviewer: [id number]
Initial Review Date: 2020
Generated: 20  10:25 am 
DCP- Discharge Planning
 
Updated by [id number]: Arlene Bowers on 20   8:16 am CT
Patient Name: NAWAF EISENBERG                                     
Admission Status: ER   
Accout number: [id number]                              
Admission Date: 09-   
: 1955                                                        
Admission Diagnosis:SEPSIS, UNSPECIFIED ORGANISM   
Attending: ABDIEL PETERSON                                                
Current LOS:  6   
  
Anticipated DC Date:    
Planned Disposition: Home or Self Care   
Primary Insurance: [employer]IFE   
  
  
Discharge Planning Comments: CM called pt room and spoke with patient to 
complete initial dc planning assessment.  CM educated patient on the CM role 
and verbal consent given by patient to complete assessment.  CM verified 
patient's address, phone number, and emergency contact phone numbers.  
Patient lives at home with family.  At discharge patient plans to return home 
and feels this is a safe discharge.  CM discussed availability of home health,
 rehab services, and medical equipment. The patient is currently on 
 
continuous oxygen and may need oxygen at discharge.  Pt verbalized choice for 
[facility]. Patient denies other known discharge needs at this time. 
Transportation provider at discharge will be his wife. CM will continue to 
follow and will assist as needed with dc plans/needs.    
  
: Arlene Bowers
 DCPIA - Discharge Planning Initial Assessment
 
Updated by [id number]: Arlene Bowers on 20   8:57 am
*  Is the patient Alert and Oriented?
Yes
*  How many steps to enter\exit or inside your home? 0/0 *  PCP NICHOLAS *  Pharmacy [facility]
*  Preadmission Environment
Home with Family
*  ADLs
Independent
*  List name and contact numbers for known caregivers / representatives who 
currently or will assist patient after discharge:
SOLOMON WIFE [phone]  SYMONE -[phone]
 
*  Verbal permission to speak to the caregivers and representatives has been 
obtained from the patient.
Yes
*  Community resources currently utilized
None
*  Additional services required to return to the preadmission environment?
Yes
*  Can the patient safely return to the preadmission environment?
Yes
*  Has this patient been hospitalized within the prior 30 days at any 
hospital?
No
 
 
 
 
 
 
Patient Name: NAWAF EISENBERG
 
Encounter #: [id number]
Page [id number]
 
 
 
 
 
Electronically Signed by EVAN ALDANA on 20 at 0925
 
 
 
 
 
 
**All edits/amendments must be made on the electronic document**
 
DICTATION DATE: 20     : PARAG  20     
RPT#: [id number]                                DC DATE:        
                                               STATUS: ADM IN  
[facility]
 [place] [address]
***END OF REPORT***

## 2020-09-21 NOTE — MORECARE
CASE MANAGEMENT DISCHARGE SUMMARY
 
 
PATIENT: NAWAF EISENBERG                    UNIT: [id number]
ACCOUNT#: [id number]                       ADM DATE: 09/15/20
AGE: 65     : 51  SEX: M            ROOM/BED: D.2140    
AUTHOR: KATYADOC                             PHYSICIAN:                               
 
REFERRING PHYSICIAN: ABDIEL PETERSON MD            
DATE OF SERVICE: 20
Discharge Plan
 
 
Patient Name: NAWAF EISENBERG
Facility: [facility]:[place]
Encounter #: [id number]
Medical Record #: [id number]
: 1955
Planned Disposition: Home or Self Care
Anticipated Discharge Date: 
 
Discharge Date: 
Expected LOS: 
Initial Reviewer: [id number]
Initial Review Date: 2020
Generated: 20   9:42 pm 
DCP- Discharge Planning
 
Updated by [id number]: Arlene Bowers on 20   8:16 am CT
Patient Name: NAWAF EISENBERG                                     
Admission Status: ER   
Accout number: [id number]                              
Admission Date: 09-   
: 1955                                                        
Admission Diagnosis:SEPSIS, UNSPECIFIED ORGANISM   
Attending: ABDIEL PETERSON                                                
Current LOS:  6   
  
Anticipated DC Date:    
Planned Disposition: Home or Self Care   
Primary Insurance: [employer]IFE   
  
  
Discharge Planning Comments: CM called pt room and spoke with patient to 
complete initial dc planning assessment.  CM educated patient on the CM role 
and verbal consent given by patient to complete assessment.  CM verified 
patient's address, phone number, and emergency contact phone numbers.  
Patient lives at home with family.  At discharge patient plans to return home 
and feels this is a safe discharge.  CM discussed availability of home health,
 rehab services, and medical equipment. The patient is currently on 
 
continuous oxygen and may need oxygen at discharge.  Pt verbalized choice for 
[facility]. Patient denies other known discharge needs at this time. 
Transportation provider at discharge will be his wife. CM will continue to 
follow and will assist as needed with dc plans/needs.    
  
: Arlene Bowers
 DCPIA - Discharge Planning Initial Assessment
 
Updated by [id number]: Arlene Bowers on 20   8:39 pm
*  Is the patient Alert and Oriented?
Yes
*  How many steps to enter\exit or inside your home? 0/0 *  PCP NICHOLAS *  Pharmacy [facility]
*  Preadmission Environment
Home with Family
*  ADLs
Independent
*  Equipment
Cane
*  List name and contact numbers for known caregivers / representatives who 
currently or will assist patient after discharge:
SOLOMON WIFE [phone]  SYMONE -[phone]
 
*  Verbal permission to speak to the caregivers and representatives has been 
obtained from the patient.
Yes
*  Community resources currently utilized
None
*  Additional services required to return to the preadmission environment?
Yes
*  Can the patient safely return to the preadmission environment?
Yes
*  Has this patient been hospitalized within the prior 30 days at any 
hospital?
No
 
 
 
 
 
 
 
Last DP export: 20   8:25 a
Patient Name: NAWAF EISENBERG
 
Encounter #: [id number]
Page [id number]
 
 
 
 
 
Electronically Signed by EVAN ALDANA on 20 at 204
 
 
 
 
 
 
**All edits/amendments must be made on the electronic document**
 
DICTATION DATE: 20     : PARAG  20     
RPT#: [id number]                                DC DATE:        
                                               STATUS: ADM IN  
[facility]
[address] [place] [address]
***END OF REPORT***

## 2020-09-21 NOTE — NUR
PT AWAKE AND ORIENTED. COLLECTED STOOL SAMPLE. AMBULATED SELF TO BATHROOM
USING CANE, STANDBY ASSIST. SOME WEAKNESS AND INSTABILITY NOTED, BUT PT WAS
ABLE TO CORRECT HIMSELF. LARGE BM. BACK TO BED WITH SELF AMULATION. GOWN AND
LINNENS CHANGED. TELEMTRY REPLACED. NO COMPLAINTS OR CONCERNS AT THIS TIME.
TOOK ALL MEDICATIONS WITHOUT COMPLICATIONS. CL IN REACH, SRX2.

## 2020-09-22 VITALS — SYSTOLIC BLOOD PRESSURE: 114 MMHG | DIASTOLIC BLOOD PRESSURE: 72 MMHG

## 2020-09-22 VITALS — SYSTOLIC BLOOD PRESSURE: 119 MMHG | DIASTOLIC BLOOD PRESSURE: 81 MMHG

## 2020-09-22 VITALS — SYSTOLIC BLOOD PRESSURE: 153 MMHG | DIASTOLIC BLOOD PRESSURE: 73 MMHG

## 2020-09-22 LAB
ALBUMIN SERPL ELPH-MCNC: 2.6 G/DL (ref 2.9–4.4)
ALBUMIN/GLOB SERPL: 1 {RATIO} (ref 0.7–1.7)
ALPHA1 GLOB SERPL ELPH-MCNC: 0.2 G/DL (ref 0–0.4)
ALPHA2 GLOB SERPL ELPH-MCNC: 0.7 G/DL (ref 0.4–1)
ANION GAP SERPL CALC-SCNC: 13.8 MMOL/L (ref 8–16)
B-GLOBULIN SERPL ELPH-MCNC: 0.5 G/DL (ref 0.7–1.3)
BASOPHILS NFR BLD AUTO: 0.1 % (ref 0–2)
BUN SERPL-MCNC: 46 MG/DL (ref 7–18)
CALCIUM SERPL-MCNC: 7.6 MG/DL (ref 8.5–10.1)
CHLORIDE SERPL-SCNC: 106 MMOL/L (ref 98–107)
CO2 SERPL-SCNC: 21.1 MMOL/L (ref 21–32)
CREAT SERPL-MCNC: 4.9 MG/DL (ref 0.6–1.3)
EOSINOPHIL NFR BLD: 0.9 % (ref 0–7)
ERYTHROCYTE [DISTWIDTH] IN BLOOD BY AUTOMATED COUNT: 12.1 % (ref 11.5–14.5)
GAMMA GLOB SERPL ELPH-MCNC: 1.2 G/DL (ref 0.4–1.8)
GLUCOSE SERPL-MCNC: 168 MG/DL (ref 74–106)
HCT VFR BLD CALC: 27.6 % (ref 42–54)
HGB BLD-MCNC: 8.5 G/DL (ref 13.5–17.5)
IMM GRANULOCYTES NFR BLD: 0.4 % (ref 0–5)
LYMPHOCYTES NFR BLD AUTO: 6.3 % (ref 15–50)
MAGNESIUM SERPL-MCNC: 1.5 MG/DL (ref 1.8–2.4)
MCH RBC QN AUTO: 29.4 PG (ref 26–34)
MCHC RBC AUTO-ENTMCNC: 30.8 G/DL (ref 31–37)
MCV RBC: 95.5 FL (ref 80–100)
MONOCYTES NFR BLD: 4.1 % (ref 2–11)
NEUTROPHILS NFR BLD AUTO: 88.2 % (ref 40–80)
OSMOLALITY SERPL CALC.SUM OF ELEC: 291 MOSM/KG (ref 275–300)
PHOSPHATE SERPL-MCNC: 4.2 MG/DL (ref 2.5–4.9)
PLATELET # BLD: 146 10X3/UL (ref 130–400)
PMV BLD AUTO: 9.9 FL (ref 7.4–10.4)
POTASSIUM SERPL-SCNC: 2.9 MMOL/L (ref 3.5–5.1)
PROT SERPL-MCNC: 5.2 G/DL (ref 6–8.5)
RBC # BLD AUTO: 2.89 10X6/UL (ref 4.2–6.1)
SODIUM SERPL-SCNC: 138 MMOL/L (ref 136–145)
WBC # BLD AUTO: 16.2 10X3/UL (ref 4.8–10.8)

## 2020-09-22 NOTE — NUR
Nutrition Follow-up:
Pt in droplet isolation; covid-19+. Chart reviewed. PO intake seems to be
improving per PO records.
Diet: Regular
Wt: 166# (9/16)
Labs reviewed
Meds reviewed
-Encourage PO intake and honor food preferences.
-Offer nutrition supplements.
-Monitor wt.
-RD following.

## 2020-09-22 NOTE — NUR
LYING IN BED AWAKE, ALERT, REQUEST EXTRA BLANKET--TEMP 97.3 ORAL--X-TRA
BLANKET GIVEN/REQUEST--TEMP ADJUSTED IN ROOM, DENIES ANY FURTHER NEEDS.

## 2020-09-22 NOTE — NUR
AT APPROX 1600 PT'S QUIROGA CATH DC'D/DR ORDER--10CC CLEAR LIQUID REMOVED FROM
BULB--QUIROGA CATH THEN DC'DE--PT TOLERATED WELL. PULL-UP PLACED ON
PT--INSTRUCTED PT TO CALL FOR
IF NEED ASSIST TO RESTROOM W/UNDERTANDING STATED. NO FURTHER NEEDS STATED AT
THIS TIME

## 2020-09-23 VITALS — SYSTOLIC BLOOD PRESSURE: 129 MMHG | DIASTOLIC BLOOD PRESSURE: 88 MMHG

## 2020-09-23 VITALS — DIASTOLIC BLOOD PRESSURE: 86 MMHG | SYSTOLIC BLOOD PRESSURE: 135 MMHG

## 2020-09-23 VITALS — SYSTOLIC BLOOD PRESSURE: 118 MMHG | DIASTOLIC BLOOD PRESSURE: 80 MMHG

## 2020-09-23 VITALS — DIASTOLIC BLOOD PRESSURE: 79 MMHG | SYSTOLIC BLOOD PRESSURE: 133 MMHG

## 2020-09-23 VITALS — SYSTOLIC BLOOD PRESSURE: 131 MMHG | DIASTOLIC BLOOD PRESSURE: 80 MMHG

## 2020-09-23 LAB
ANION GAP SERPL CALC-SCNC: 17.4 MMOL/L (ref 8–16)
BASOPHILS NFR BLD AUTO: 0.1 % (ref 0–2)
BUN SERPL-MCNC: 51 MG/DL (ref 7–18)
CALCIUM SERPL-MCNC: 7.8 MG/DL (ref 8.5–10.1)
CHLORIDE SERPL-SCNC: 108 MMOL/L (ref 98–107)
CO2 SERPL-SCNC: 19.6 MMOL/L (ref 21–32)
CREAT SERPL-MCNC: 4.7 MG/DL (ref 0.6–1.3)
EOSINOPHIL NFR BLD: 0.3 % (ref 0–7)
ERYTHROCYTE [DISTWIDTH] IN BLOOD BY AUTOMATED COUNT: 12.3 % (ref 11.5–14.5)
GLUCOSE SERPL-MCNC: 78 MG/DL (ref 74–106)
HCT VFR BLD CALC: 26.8 % (ref 42–54)
HGB BLD-MCNC: 8.3 G/DL (ref 13.5–17.5)
IMM GRANULOCYTES NFR BLD: 0.7 % (ref 0–5)
LYMPHOCYTES NFR BLD AUTO: 5.2 % (ref 15–50)
MAGNESIUM SERPL-MCNC: 2.1 MG/DL (ref 1.8–2.4)
MCH RBC QN AUTO: 30 PG (ref 26–34)
MCHC RBC AUTO-ENTMCNC: 31 G/DL (ref 31–37)
MCV RBC: 96.8 FL (ref 80–100)
MONOCYTES NFR BLD: 7.5 % (ref 2–11)
NEUTROPHILS NFR BLD AUTO: 86.2 % (ref 40–80)
OSMOLALITY SERPL CALC.SUM OF ELEC: 293 MOSM/KG (ref 275–300)
PHOSPHATE SERPL-MCNC: 4.7 MG/DL (ref 2.5–4.9)
PLATELET # BLD: 175 10X3/UL (ref 130–400)
PMV BLD AUTO: 10.4 FL (ref 7.4–10.4)
POTASSIUM SERPL-SCNC: 4 MMOL/L (ref 3.5–5.1)
RBC # BLD AUTO: 2.77 10X6/UL (ref 4.2–6.1)
SODIUM SERPL-SCNC: 141 MMOL/L (ref 136–145)
WBC # BLD AUTO: 16.9 10X3/UL (ref 4.8–10.8)

## 2020-09-23 NOTE — NUR
LYING IN BED AWAKE, ALERT, ORIENTED. GOWN BROUGHT TO ROOM/REQUEST--NEW
BATTERIES PLACED IN CM. PT DENIES ANY FURTHER NEEDS AT THIS TIME.

## 2020-09-24 VITALS — DIASTOLIC BLOOD PRESSURE: 78 MMHG | SYSTOLIC BLOOD PRESSURE: 128 MMHG

## 2020-09-24 VITALS — SYSTOLIC BLOOD PRESSURE: 125 MMHG | DIASTOLIC BLOOD PRESSURE: 85 MMHG

## 2020-09-24 VITALS — DIASTOLIC BLOOD PRESSURE: 70 MMHG | SYSTOLIC BLOOD PRESSURE: 109 MMHG

## 2020-09-24 VITALS — DIASTOLIC BLOOD PRESSURE: 93 MMHG | SYSTOLIC BLOOD PRESSURE: 149 MMHG

## 2020-09-24 VITALS — DIASTOLIC BLOOD PRESSURE: 86 MMHG | SYSTOLIC BLOOD PRESSURE: 142 MMHG

## 2020-09-24 VITALS — DIASTOLIC BLOOD PRESSURE: 78 MMHG | SYSTOLIC BLOOD PRESSURE: 121 MMHG

## 2020-09-24 LAB
ANION GAP SERPL CALC-SCNC: 15.2 MMOL/L (ref 8–16)
BASOPHILS NFR BLD AUTO: 0 % (ref 0–2)
BUN SERPL-MCNC: 57 MG/DL (ref 7–18)
CALCIUM SERPL-MCNC: 7.7 MG/DL (ref 8.5–10.1)
CHLORIDE SERPL-SCNC: 110 MMOL/L (ref 98–107)
CO2 SERPL-SCNC: 18.6 MMOL/L (ref 21–32)
CREAT SERPL-MCNC: 5.3 MG/DL (ref 0.6–1.3)
EOSINOPHIL NFR BLD: 0.3 % (ref 0–7)
ERYTHROCYTE [DISTWIDTH] IN BLOOD BY AUTOMATED COUNT: 12.7 % (ref 11.5–14.5)
GLUCOSE SERPL-MCNC: 88 MG/DL (ref 74–106)
HCT VFR BLD CALC: 26.9 % (ref 42–54)
HGB BLD-MCNC: 8.2 G/DL (ref 13.5–17.5)
IMM GRANULOCYTES NFR BLD: 0.7 % (ref 0–5)
LYMPHOCYTES NFR BLD AUTO: 6.9 % (ref 15–50)
MAGNESIUM SERPL-MCNC: 1.8 MG/DL (ref 1.8–2.4)
MCH RBC QN AUTO: 29.3 PG (ref 26–34)
MCHC RBC AUTO-ENTMCNC: 30.5 G/DL (ref 31–37)
MCV RBC: 96.1 FL (ref 80–100)
MONOCYTES NFR BLD: 11.3 % (ref 2–11)
NEUTROPHILS NFR BLD AUTO: 80.8 % (ref 40–80)
OSMOLALITY SERPL CALC.SUM OF ELEC: 292 MOSM/KG (ref 275–300)
PHOSPHATE SERPL-MCNC: 4.7 MG/DL (ref 2.5–4.9)
PLATELET # BLD: 211 10X3/UL (ref 130–400)
PMV BLD AUTO: 10.4 FL (ref 7.4–10.4)
POTASSIUM SERPL-SCNC: 4.8 MMOL/L (ref 3.5–5.1)
RBC # BLD AUTO: 2.8 10X6/UL (ref 4.2–6.1)
SODIUM SERPL-SCNC: 139 MMOL/L (ref 136–145)
WBC # BLD AUTO: 16.3 10X3/UL (ref 4.8–10.8)

## 2020-09-24 NOTE — NUR
Nutrition Follow-up:
Pt in droplet isolation; covid-19+. Chart reviewed. MD reports good appetite.
Diet: Regular
Wt: 166# (9/16)
Labs noted: Ca 7.7
Meds noted: folate, zinc sulfate, vitamin D, vitamin C, Pepcid, Florajen
-Encourage PO intake and honor food preferences.
-Need new wt if possible; noted daily wts ordered.
-RD following.

## 2020-09-24 NOTE — NUR
ASSUMED CARE OF PATIENT FROM PREVIOUS SHIFT. IN COVID DROPLET ISOLATION.
GLASSES ON. CANE LAYING ON BED. QUIROGA CATH PATENT WITH CLEAR YELLOW URINE.
EMPTIED 1000 CC URINE. ON ROOM AIR. LUNGS CLEAR WHEN LISTENING TO THEM. RIGHT
IJ TRIALYSIS WITH NURSE PORT SEEN WITH NS INFUSING AT 50 CC/HR. DRESSING IS
CLEAN, DRY, INTACT. INSTURCTED TO USE CALL LIGHT FOR ANY NEEDS. STATES TO
UNDERSTANDING.

## 2020-09-24 NOTE — NUR
16FR UQIROGA CATH INSERTED W/1200CC'S CLEAR YELLOW URINE NOTED TO  BAG AFTER
INSERTION. PT TOLERATED ALL WELL.

## 2020-09-25 VITALS — SYSTOLIC BLOOD PRESSURE: 134 MMHG | DIASTOLIC BLOOD PRESSURE: 83 MMHG

## 2020-09-25 VITALS — DIASTOLIC BLOOD PRESSURE: 70 MMHG | SYSTOLIC BLOOD PRESSURE: 108 MMHG

## 2020-09-25 VITALS — DIASTOLIC BLOOD PRESSURE: 88 MMHG | SYSTOLIC BLOOD PRESSURE: 135 MMHG

## 2020-09-25 VITALS — SYSTOLIC BLOOD PRESSURE: 113 MMHG | DIASTOLIC BLOOD PRESSURE: 72 MMHG

## 2020-09-25 VITALS — SYSTOLIC BLOOD PRESSURE: 113 MMHG | DIASTOLIC BLOOD PRESSURE: 70 MMHG

## 2020-09-25 VITALS — DIASTOLIC BLOOD PRESSURE: 79 MMHG | SYSTOLIC BLOOD PRESSURE: 130 MMHG

## 2020-09-25 LAB
ANION GAP SERPL CALC-SCNC: 15.2 MMOL/L (ref 8–16)
BASOPHILS NFR BLD AUTO: 0 % (ref 0–2)
BUN SERPL-MCNC: 63 MG/DL (ref 7–18)
CALCIUM SERPL-MCNC: 7.4 MG/DL (ref 8.5–10.1)
CHLORIDE SERPL-SCNC: 109 MMOL/L (ref 98–107)
CO2 SERPL-SCNC: 16.8 MMOL/L (ref 21–32)
CREAT SERPL-MCNC: 5.1 MG/DL (ref 0.6–1.3)
EOSINOPHIL NFR BLD: 0.2 % (ref 0–7)
ERYTHROCYTE [DISTWIDTH] IN BLOOD BY AUTOMATED COUNT: 12.9 % (ref 11.5–14.5)
GLUCOSE SERPL-MCNC: 142 MG/DL (ref 74–106)
HCT VFR BLD CALC: 27.1 % (ref 42–54)
HGB BLD-MCNC: 8.2 G/DL (ref 13.5–17.5)
IMM GRANULOCYTES NFR BLD: 1.6 % (ref 0–5)
LYMPHOCYTES NFR BLD AUTO: 6 % (ref 15–50)
MCH RBC QN AUTO: 29.7 PG (ref 26–34)
MCHC RBC AUTO-ENTMCNC: 30.3 G/DL (ref 31–37)
MCV RBC: 98.2 FL (ref 80–100)
MONOCYTES NFR BLD: 4.7 % (ref 2–11)
NEUTROPHILS NFR BLD AUTO: 87.5 % (ref 40–80)
OSMOLALITY SERPL CALC.SUM OF ELEC: 291 MOSM/KG (ref 275–300)
PLATELET # BLD: 183 10X3/UL (ref 130–400)
PMV BLD AUTO: 9.7 FL (ref 7.4–10.4)
POTASSIUM SERPL-SCNC: 5 MMOL/L (ref 3.5–5.1)
RBC # BLD AUTO: 2.76 10X6/UL (ref 4.2–6.1)
SODIUM SERPL-SCNC: 136 MMOL/L (ref 136–145)
WBC # BLD AUTO: 10.9 10X3/UL (ref 4.8–10.8)

## 2020-09-25 NOTE — NUR
1530--ATTEMPTED TO DRAW BLOOD FROM TRIALYSIS PORT W/O SUCCESS/DR PETERSON'S
ORDERS--LAB CONTACTED FOR LAB DRAW AT THIS TIME.

## 2020-09-25 NOTE — MORECARE
CASE MANAGEMENT DISCHARGE SUMMARY
 
 
PATIENT: NAWAF EISENBERG                    UNIT: [id number]
ACCOUNT#: [id number]                       ADM DATE: 09/15/20
AGE: 65     : 51  SEX: M            ROOM/BED: D.2140    
AUTHOR: KATYADOC                             PHYSICIAN:                               
 
REFERRING PHYSICIAN: ABDIEL PETERSON MD            
DATE OF SERVICE: 20
Discharge Plan
 
 
Patient Name: NAWAF EISENBERG
Facility: [facility]:[place]
Encounter #: [id number]
Medical Record #: [id number]
: 1955
Planned Disposition: Home or Self Care
Anticipated Discharge Date: 
 
Discharge Date: 
Expected LOS: 
Initial Reviewer: [id number]
Initial Review Date: 2020
Generated: 20   3:52 pm 
DCP- Discharge Planning
 
Updated by [id number]: Arlene Bowers on 20   8:16 am CT
Patient Name: NAWAF EISENBERG                                     
Admission Status: ER   
Accout number: [id number]                              
Admission Date: 09-   
: 1955                                                        
Admission Diagnosis:SEPSIS, UNSPECIFIED ORGANISM   
Attending: ABDIEL PETERSON                                                
Current LOS:  6   
  
Anticipated DC Date:    
Planned Disposition: Home or Self Care   
Primary Insurance: [employer]IFE   
  
  
Discharge Planning Comments: CM called pt room and spoke with patient to 
complete initial dc planning assessment.  CM educated patient on the CM role 
and verbal consent given by patient to complete assessment.  CM verified 
patient's address, phone number, and emergency contact phone numbers.  
Patient lives at home with family.  At discharge patient plans to return home 
and feels this is a safe discharge.  CM discussed availability of home health,
 rehab services, and medical equipment. The patient is currently on 
 
continuous oxygen and may need oxygen at discharge.  Pt verbalized choice for 
[facility]. Patient denies other known discharge needs at this time. 
Transportation provider at discharge will be his wife. CM will continue to 
follow and will assist as needed with dc plans/needs.    
  
: Arlene Bowers
 DCPIA - Discharge Planning Initial Assessment
 
Updated by [id number]: Arlene Bowers on 20   8:39 pm
*  Is the patient Alert and Oriented?
Yes
*  How many steps to enter\exit or inside your home? 0/0 *  PCP NICHOLAS *  Pharmacy [facility]
*  Preadmission Environment
Home with Family
*  ADLs
Independent
*  Equipment
Cane
*  List name and contact numbers for known caregivers / representatives who 
currently or will assist patient after discharge:
SOLOMON WIFE [phone]  SYMONE -[phone]
 
*  Verbal permission to speak to the caregivers and representatives has been 
obtained from the patient.
Yes
*  Community resources currently utilized
None
*  Additional services required to return to the preadmission environment?
Yes
*  Can the patient safely return to the preadmission environment?
Yes
*  Has this patient been hospitalized within the prior 30 days at any 
hospital?
No
 
 
 
 
 
 
 
Last DP export: 20   7:42 p
Patient Name: NAWAF EISENBERG
 
Encounter #: [id number]
Page [id number]
 
 
 
 
 
Electronically Signed by EVAN ALDANA on 20 at 1453
 
 
 
 
 
 
**All edits/amendments must be made on the electronic document**
 
DICTATION DATE: 20     : PARAG  20     
RPT#: [id number]                                DC DATE:        
                                               STATUS: ADM IN  
[facility]
 [place] [address]
***END OF REPORT***

## 2020-09-25 NOTE — NUR
1856--DR PETERSON CONTACTED THE [facility] STATION--LAB LEVELS QUOTED TO DR PETERSON--
 
V.O. TO FROM DR PETERSON TO CONTACT DR ALONZO REGARDING THIS PARTICULAR PT W/BUN
AND CREAT LEVELS AND TO TELL DR ALONZO THAT THE PT IS ADIMENT ABOUT GOING
HOME.
 
1858--MESSAGE LEFT FOR DR ALONZO TO CONTACT THIS HOSPITAL REGARDING THIS PT.

## 2020-09-25 NOTE — NUR
IS GIVEN TO PATIENT AND INSTRUCTED IN USE.  ABLE TO PREFORM X 5 WITH VOLUME AT
1000 ML. INSTRUCTED TO DO EVERY HOUR WHILE AWAKE. STATES TO UNDERSTANDING.

## 2020-09-26 VITALS — DIASTOLIC BLOOD PRESSURE: 67 MMHG | SYSTOLIC BLOOD PRESSURE: 101 MMHG

## 2020-09-26 VITALS — DIASTOLIC BLOOD PRESSURE: 70 MMHG | SYSTOLIC BLOOD PRESSURE: 110 MMHG

## 2020-09-26 VITALS — SYSTOLIC BLOOD PRESSURE: 100 MMHG | DIASTOLIC BLOOD PRESSURE: 62 MMHG

## 2020-09-26 VITALS — SYSTOLIC BLOOD PRESSURE: 119 MMHG | DIASTOLIC BLOOD PRESSURE: 76 MMHG

## 2020-09-26 VITALS — DIASTOLIC BLOOD PRESSURE: 63 MMHG | SYSTOLIC BLOOD PRESSURE: 98 MMHG

## 2020-09-26 LAB
ALBUMIN SERPL-MCNC: 2.2 G/DL (ref 3.4–5)
ALP SERPL-CCNC: 100 U/L (ref 30–120)
ALT SERPL-CCNC: 131 U/L (ref 10–68)
ANION GAP SERPL CALC-SCNC: 15.5 MMOL/L (ref 8–16)
BASOPHILS NFR BLD AUTO: 0 % (ref 0–2)
BILIRUB SERPL-MCNC: 0.18 MG/DL (ref 0.2–1.3)
BILIRUB SERPL-MCNC: NEGATIVE MG/DL
BUN SERPL-MCNC: 67 MG/DL (ref 7–18)
CALCIUM SERPL-MCNC: 7.9 MG/DL (ref 8.5–10.1)
CHLORIDE SERPL-SCNC: 111 MMOL/L (ref 98–107)
CO2 SERPL-SCNC: 17.2 MMOL/L (ref 21–32)
CREAT SERPL-MCNC: 5 MG/DL (ref 0.6–1.3)
EOSINOPHIL NFR BLD: 0.4 % (ref 0–7)
ERYTHROCYTE [DISTWIDTH] IN BLOOD BY AUTOMATED COUNT: 13 % (ref 11.5–14.5)
GLOBULIN SER-MCNC: 3.2 G/L
GLUCOSE SERPL-MCNC: 86 MG/DL (ref 74–106)
HCT VFR BLD CALC: 25 % (ref 42–54)
HGB BLD-MCNC: 7.7 G/DL (ref 13.5–17.5)
IMM GRANULOCYTES NFR BLD: 1.4 % (ref 0–5)
KETONES UR STRIP-MCNC: NEGATIVE MG/DL
LYMPHOCYTES NFR BLD AUTO: 9.2 % (ref 15–50)
MCH RBC QN AUTO: 29.8 PG (ref 26–34)
MCHC RBC AUTO-ENTMCNC: 30.8 G/DL (ref 31–37)
MCV RBC: 96.9 FL (ref 80–100)
MONOCYTES NFR BLD: 12.1 % (ref 2–11)
NEUTROPHILS NFR BLD AUTO: 76.9 % (ref 40–80)
NITRITE UR-MCNC: NEGATIVE MG/ML
OSMOLALITY SERPL CALC.SUM OF ELEC: 295 MOSM/KG (ref 275–300)
PH UR STRIP: 6 [PH] (ref 5–8)
PLATELET # BLD: 187 10X3/UL (ref 130–400)
PMV BLD AUTO: 9.8 FL (ref 7.4–10.4)
POTASSIUM SERPL-SCNC: 4.7 MMOL/L (ref 3.5–5.1)
PROT SERPL-MCNC: 5.4 G/DL (ref 6.4–8.2)
RBC # BLD AUTO: 2.58 10X6/UL (ref 4.2–6.1)
SODIUM SERPL-SCNC: 139 MMOL/L (ref 136–145)
UROBILINOGEN UR-MCNC: NORMAL MG/DL (ref ?–2)
WBC # BLD AUTO: 12.4 10X3/UL (ref 4.8–10.8)

## 2020-09-27 VITALS — SYSTOLIC BLOOD PRESSURE: 110 MMHG | DIASTOLIC BLOOD PRESSURE: 64 MMHG

## 2020-09-27 VITALS — DIASTOLIC BLOOD PRESSURE: 59 MMHG | SYSTOLIC BLOOD PRESSURE: 96 MMHG

## 2020-09-27 VITALS — DIASTOLIC BLOOD PRESSURE: 56 MMHG | SYSTOLIC BLOOD PRESSURE: 99 MMHG

## 2020-09-27 VITALS — SYSTOLIC BLOOD PRESSURE: 102 MMHG | DIASTOLIC BLOOD PRESSURE: 62 MMHG

## 2020-09-27 VITALS — SYSTOLIC BLOOD PRESSURE: 105 MMHG | DIASTOLIC BLOOD PRESSURE: 69 MMHG

## 2020-09-27 VITALS — SYSTOLIC BLOOD PRESSURE: 97 MMHG | DIASTOLIC BLOOD PRESSURE: 62 MMHG

## 2020-09-27 LAB
ANION GAP SERPL CALC-SCNC: 19.1 MMOL/L (ref 8–16)
BASOPHILS NFR BLD AUTO: 0.1 % (ref 0–2)
BUN SERPL-MCNC: 72 MG/DL (ref 7–18)
CALCIUM SERPL-MCNC: 7.5 MG/DL (ref 8.5–10.1)
CHLORIDE SERPL-SCNC: 110 MMOL/L (ref 98–107)
CO2 SERPL-SCNC: 15.1 MMOL/L (ref 21–32)
CREAT SERPL-MCNC: 5.6 MG/DL (ref 0.6–1.3)
EOSINOPHIL NFR BLD: 2.7 % (ref 0–7)
ERYTHROCYTE [DISTWIDTH] IN BLOOD BY AUTOMATED COUNT: 13.2 % (ref 11.5–14.5)
GLUCOSE SERPL-MCNC: 82 MG/DL (ref 74–106)
HCT VFR BLD CALC: 27.2 % (ref 42–54)
HGB BLD-MCNC: 8.1 G/DL (ref 13.5–17.5)
IMM GRANULOCYTES NFR BLD: 2 % (ref 0–5)
LYMPHOCYTES NFR BLD AUTO: 12.7 % (ref 15–50)
MCH RBC QN AUTO: 29 PG (ref 26–34)
MCHC RBC AUTO-ENTMCNC: 29.8 G/DL (ref 31–37)
MCV RBC: 97.5 FL (ref 80–100)
MONOCYTES NFR BLD: 13.5 % (ref 2–11)
NEUTROPHILS NFR BLD AUTO: 69 % (ref 40–80)
OSMOLALITY SERPL CALC.SUM OF ELEC: 298 MOSM/KG (ref 275–300)
PLATELET # BLD: 174 10X3/UL (ref 130–400)
PMV BLD AUTO: 9.7 FL (ref 7.4–10.4)
POTASSIUM SERPL-SCNC: 4.2 MMOL/L (ref 3.5–5.1)
RBC # BLD AUTO: 2.79 10X6/UL (ref 4.2–6.1)
SODIUM SERPL-SCNC: 140 MMOL/L (ref 136–145)
WBC # BLD AUTO: 10.6 10X3/UL (ref 4.8–10.8)

## 2020-09-27 NOTE — NUR
AM MEDS GIVEN AT THIS TIME. PT A/O X4, RESP EVEN AND NONLABORED ON RA. RT IJ
TRIALYSIS INFUSING 1/2NS AT 75CC/HR. QUIROGA DRAINING DARK URINE TO GRAVITY. PT
DENIES ANY NEEDS AT THIS TIME. CALL LIGHT IN REACH,NAD NOTED, WILL CONTINUE TO
MONIOTR.

## 2020-09-27 NOTE — NUR
PT UP TO RESTROOM X1 ASSIT. NO S/S OF DISTRESS OBSERVED. NO NEEDS EXPRESSED.
CALL LIGHT IN REACH. WILL CPOC.

## 2020-09-28 VITALS — DIASTOLIC BLOOD PRESSURE: 74 MMHG | SYSTOLIC BLOOD PRESSURE: 121 MMHG

## 2020-09-28 VITALS — SYSTOLIC BLOOD PRESSURE: 105 MMHG | DIASTOLIC BLOOD PRESSURE: 61 MMHG

## 2020-09-28 VITALS — SYSTOLIC BLOOD PRESSURE: 123 MMHG | DIASTOLIC BLOOD PRESSURE: 70 MMHG

## 2020-09-28 VITALS — SYSTOLIC BLOOD PRESSURE: 104 MMHG | DIASTOLIC BLOOD PRESSURE: 67 MMHG

## 2020-09-28 LAB
ANION GAP SERPL CALC-SCNC: 17.5 MMOL/L (ref 8–16)
BASOPHILS NFR BLD AUTO: 0 % (ref 0–2)
BUN SERPL-MCNC: 64 MG/DL (ref 7–18)
CALCIUM SERPL-MCNC: 7.6 MG/DL (ref 8.5–10.1)
CHLORIDE SERPL-SCNC: 107 MMOL/L (ref 98–107)
CO2 SERPL-SCNC: 15.6 MMOL/L (ref 21–32)
CREAT SERPL-MCNC: 4.9 MG/DL (ref 0.6–1.3)
EOSINOPHIL NFR BLD: 2.8 % (ref 0–7)
ERYTHROCYTE [DISTWIDTH] IN BLOOD BY AUTOMATED COUNT: 13.7 % (ref 11.5–14.5)
GLUCOSE SERPL-MCNC: 76 MG/DL (ref 74–106)
HCT VFR BLD CALC: 22.9 % (ref 42–54)
HGB BLD-MCNC: 7 G/DL (ref 13.5–17.5)
IMM GRANULOCYTES NFR BLD: 1.3 % (ref 0–5)
LYMPHOCYTES NFR BLD AUTO: 9.9 % (ref 15–50)
MCH RBC QN AUTO: 29.7 PG (ref 26–34)
MCHC RBC AUTO-ENTMCNC: 30.6 G/DL (ref 31–37)
MCV RBC: 97 FL (ref 80–100)
MONOCYTES NFR BLD: 14 % (ref 2–11)
NEUTROPHILS NFR BLD AUTO: 72 % (ref 40–80)
OSMOLALITY SERPL CALC.SUM OF ELEC: 288 MOSM/KG (ref 275–300)
PLATELET # BLD: 179 10X3/UL (ref 130–400)
PMV BLD AUTO: 9.7 FL (ref 7.4–10.4)
POTASSIUM SERPL-SCNC: 4.1 MMOL/L (ref 3.5–5.1)
RBC # BLD AUTO: 2.36 10X6/UL (ref 4.2–6.1)
SODIUM SERPL-SCNC: 136 MMOL/L (ref 136–145)
WBC # BLD AUTO: 10.8 10X3/UL (ref 4.8–10.8)

## 2020-09-28 NOTE — MORECARE
CASE MANAGEMENT DISCHARGE SUMMARY
 
 
PATIENT: NAWAF EISENBERG                    UNIT: [id number]
ACCOUNT#: [id number]                       ADM DATE: 09/15/20
AGE: 65     : 51  SEX: M            ROOM/BED: D.2140    
AUTHOR: EVAN ALDANA                             PHYSICIAN:                               
 
REFERRING PHYSICIAN: ABDIEL PETERSON MD            
DATE OF SERVICE: 20
Discharge Plan
 
 
Patient Name: NAWAF EISENBERG
Facility: [facility]:[place]
Encounter #: [id number]
Medical Record #: [id number]
: 1955
Planned Disposition: Home or Self Care
Anticipated Discharge Date: 
 
Discharge Date: 
Expected LOS: 
Initial Reviewer: [id number]
Initial Review Date: 2020
Generated: 20   2:20 pm 
Comments
 
DCP- Discharge Planning
 
Updated by [id number]: Arlene Sneed on 20  11:32 am CT
CM SPOKE WITH DR TOMLINSON ABOUT PT CONDITION, AND DR PETERSON'S RECOMMENDATION FOR 
TRANSFER FOR UROLOGY.  DR TOMLINSON CALLED DR ALONZO WHO STATED TO HAVE PT 
TRANSFERED OUT.  CM CALLED [facility] [phone] AND SPOKE WITH 
HOUSE SUPERVISOR CRESCENCIO STATES PT CAN TRANSFER THERE COVID POSITIVE AND CAN 
HAVE SURGERY WITH EXTRA PRECAUTIONS TAKEN.  [facility] HOSPITALIST WILL CALL DR PETERSON 
FOR DOC TO DOC.  CM CALLED PT IN ROOM TO UPDATE CONDITION AND ANSWERED 
QUESTIONS.  
  
: ARLENE SNEED MSN,RN,CM
DCP- Discharge Planning
 
Updated by [id number]: Arlene Sneed on 20   8:16 am CT
Patient Name: NAWAF EISENBERG                                     
Admission Status: ER   
Accout number: [id number]                              
Admission Date: 09-   
: 1955                                                        
Admission Diagnosis:SEPSIS, UNSPECIFIED ORGANISM   
Attending: ABDIEL PETERSON                                                
 
Current LOS:  6   
  
Anticipated DC Date:    
Planned Disposition: Home or Self Care   
Primary Insurance: [employer]IFE   
  
  
Discharge Planning Comments: CM called pt room and spoke with patient to 
complete initial dc planning assessment.  CM educated patient on the CM role 
and verbal consent given by patient to complete assessment.  CM verified 
patient's address, phone number, and emergency contact phone numbers.  
Patient lives at home with family.  At discharge patient plans to return home 
and feels this is a safe discharge.  CM discussed availability of home health,
 rehab services, and medical equipment. The patient is currently on 
continuous oxygen and may need oxygen at discharge.  Pt verbalized choice for 
[facility]. Patient denies other known discharge needs at this time. 
Transportation provider at discharge will be his wife. CM will continue to 
follow and will assist as needed with dc plans/needs.    
  
: Arlene Sneed
 DCPIA - Discharge Planning Initial Assessment
 
Updated by [id number]: Arlene Sneed on 20   8:39 pm
*  Is the patient Alert and Oriented?
Yes
*  How many steps to enter\exit or inside your home? 0/0 *  PCP NICHOLAS *  Pharmacy [facility]
*  Preadmission Environment
Home with Family
*  ADLs
Independent
*  Equipment
Cane
*  List name and contact numbers for known caregivers / representatives who 
currently or will assist patient after discharge:
SOLOMON WIFE [phone]  SYMONE -[phone]
 
*  Verbal permission to speak to the caregivers and representatives has been 
obtained from the patient.
Yes
*  Community resources currently utilized
None
*  Additional services required to return to the preadmission environment?
Yes
*  Can the patient safely return to the preadmission environment?
Yes
*  Has this patient been hospitalized within the prior 30 days at any 
hospital?
No
 
External Providers
External Provider: OTHER-OTHER
 
 
Next Contact Date: 
Service Request Date: 
Service Type: 
Resolution: 
 
Reviewer: 
Comments: 
 
 
 
 
 
 
Last DP export: 20  11:35 a
Patient Name: NAWAF EISENBERG
Encounter #: [id number]
Page [phone]
 
 
 
 
 
Electronically Signed by EVAN ALDANA on 20 at 1321
 
 
 
 
 
 
**All edits/amendments must be made on the electronic document**
 
DICTATION DATE: 20 1320     : PARAG  20 1320     
RPT#: [id number]                                DC DATE:        
                                               STATUS: ADM IN  
[facility]
[address] [place] [address]
***END OF REPORT***

## 2020-09-28 NOTE — NUR
QUIROGA CATHETER REMOVED PER ORDER. APPROX 600ML URINE OUTPUT RECORDED. STATLOK
REMOVED. 16FR COUDE CATHETER PLACED PER PROTOCOL AND 'S ORDERS. PT
TOLERATED WELL. STAT JUDY PLACED. CATHETER IN PLACE. WILL CTM.

## 2020-09-28 NOTE — MORECARE
CASE MANAGEMENT DISCHARGE SUMMARY
 
 
PATIENT: NAWAF EISENBERG                    UNIT: [id number]
ACCOUNT#: [id number]                       ADM DATE: 09/15/20
AGE: 65     : 51  SEX: M            ROOM/BED: D.2140    
AUTHOR: KATYA,DOC                             PHYSICIAN:                               
 
REFERRING PHYSICIAN: ABDIEL PETERSON MD            
DATE OF SERVICE: 20
Discharge Plan
 
 
Patient Name: NAWAF EISENBERG
Facility: [facility]:[place]
Encounter #: [id number]
Medical Record #: [id number]
: 1955
Planned Disposition: Home or Self Care
Anticipated Discharge Date: 
 
Discharge Date: 
Expected LOS: 
Initial Reviewer: [id number]
Initial Review Date: 2020
Generated: 20   5:27 pm 
Comments
 
DCP- Discharge Planning
 
Updated by [id number]: Arlene Sneed on 20   3:24 pm CT
CM received call from Arlene SOUZA states that she spoke with Gretchen at the 
Urology clinic who states Dr Shane is seeing pts and can transfer to [place].  
CM called Gretchen at Urology services at  [phone].  CM spoke with 
Gretchen and Dr. Shane via speaker phone.  Gretchen states he is in the clinic 
this week and not on hospital call.  [facility] does not have urology 
coverage this week per Gretchen at Urology clinic. CM updated condition, 
imaging, and history.  Dr. Shane states to have the nurse remove the Phillips and 
insert a coude.  States if he continues to have hydro nephrosis then send him 
to [place] where he will see him.  Updated condition with the nurse, Charla.
DCP- Discharge Planning
 
Updated by [id number]: Arlene Sneed on 20  12:29 pm CT
CM called Yris at [facility] to see if pt can be transferred there 
for urology services.  Yris states that she does not have urology coverage 
there this week and could not accept transfer.    
Arlene Sneed
DCP- Discharge Planning
 
Updated by [id number]: Arlene Sneed on 20  11:32 am CT
 
CM SPOKE WITH DR TOMLINSON ABOUT PT CONDITION, AND DR PETERSON'S RECOMMENDATION FOR 
TRANSFER FOR UROLOGY.  DR TOMLINSON CALLED DR ALONZO WHO STATED TO HAVE PT 
TRANSFERED OUT.  CM CALLED [facility] [phone] AND SPOKE WITH 
HOUSE SUPERVISOR CRESCENCIO STATES PT CAN TRANSFER THERE COVID POSITIVE AND CAN 
HAVE SURGERY WITH EXTRA PRECAUTIONS TAKEN.  [facility] HOSPITALIST WILL CALL DR PETERSON 
FOR DOC TO DOC.  CM CALLED PT IN ROOM TO UPDATE CONDITION AND ANSWERED 
QUESTIONS.  
  
: ARLENE SNEED MSN,RN,CM
DCP- Discharge Planning
 
Updated by [id number]: Arlene Sneed on 20   8:16 am CT
Patient Name: NAWAF EISENBERG                                     
Admission Status: ER   
Accout number: [id number]                              
Admission Date: 09-   
: 1955                                                        
Admission Diagnosis:SEPSIS, UNSPECIFIED ORGANISM   
Attending: ABDIEL PETERSON                                                
Current LOS:  6   
  
Anticipated DC Date:    
Planned Disposition: Home or Self Care   
Primary Insurance: [employer]IFE   
  
  
Discharge Planning Comments: CM called pt room and spoke with patient to 
complete initial dc planning assessment.  CM educated patient on the CM role 
and verbal consent given by patient to complete assessment.  CM verified 
patient's address, phone number, and emergency contact phone numbers.  
Patient lives at home with family.  At discharge patient plans to return home 
and feels this is a safe discharge.  CM discussed availability of home health,
 rehab services, and medical equipment. The patient is currently on 
continuous oxygen and may need oxygen at discharge.  Pt verbalized choice for 
[facility]. Patient denies other known discharge needs at this time. 
Transportation provider at discharge will be his wife. CM will continue to 
follow and will assist as needed with dc plans/needs.    
  
: Arlene Sneed
 DCPIA - Discharge Planning Initial Assessment
 
Updated by [id number]: Arlene Sneed on 20   8:39 pm
*  Is the patient Alert and Oriented?
Yes
*  How many steps to enter\exit or inside your home? 0/0 *  PCP NICHOLAS *  Pharmacy [facility]
*  Preadmission Environment
Home with Family
*  ADLs
Independent
*  Equipment
Cane
*  List name and contact numbers for known caregivers / representatives who 
 
currently or will assist patient after discharge:
SOLOMON WIFE [phone]  SYMONE -[phone]
 
*  Verbal permission to speak to the caregivers and representatives has been 
obtained from the patient.
Yes
*  Community resources currently utilized
None
*  Additional services required to return to the preadmission environment?
Yes
*  Can the patient safely return to the preadmission environment?
Yes
*  Has this patient been hospitalized within the prior 30 days at any 
hospital?
No
 
 
 
 
 
 
 
Last DP export: 20  12:35 p
Patient Name: NAWAF EISENBERG
Encounter #: [id number]
Page [id number]
 
 
 
 
 
Electronically Signed by EVAN ALDANA on 20 at 1628
 
 
 
 
 
 
**All edits/amendments must be made on the electronic document**
 
DICTATION DATE: 20     : PARAG  20     
RPT#: [id number]                                DC DATE:        
                                               STATUS: ADM IN  
[facility]
 [place] [address]
***END OF REPORT***

## 2020-09-28 NOTE — MORECARE
CASE MANAGEMENT DISCHARGE SUMMARY
 
 
PATIENT: NAWAF EISENBERG                    UNIT: [id number]
ACCOUNT#: [id number]                       ADM DATE: 09/15/20
AGE: 65     : 51  SEX: M            ROOM/BED: D.2140    
AUTHOR: EVAN ALDANA                             PHYSICIAN:                               
 
REFERRING PHYSICIAN: ABDIEL PETERSON MD            
DATE OF SERVICE: 20
Discharge Plan
 
 
Patient Name: NAWAF EISENBERG
Facility: [facility]:[place]
Encounter #: [id number]
Medical Record #: [id number]
: 1955
Planned Disposition: Home or Self Care
Anticipated Discharge Date: 
 
Discharge Date: 
Expected LOS: 
Initial Reviewer: [id number]
Initial Review Date: 2020
Generated: 20   2:35 pm 
Comments
 
DCP- Discharge Planning
 
Updated by [id number]: Arlene Sneed on 20  12:29 pm CT
CM called Yris at [facility] to see if pt can be transferred there 
for urology services.  Yris states that she does not have urology coverage 
there this week and could not accept transfer.    
Arlene Sneed
DCP- Discharge Planning
 
Updated by [id number]: Arlene Sneed on 20  11:32 am CT
CM SPOKE WITH DR TOMLINSON ABOUT PT CONDITION, AND DR PETERSON'S RECOMMENDATION FOR 
TRANSFER FOR UROLOGY.  DR TOMLINSON CALLED DR ALONZO WHO STATED TO HAVE PT 
TRANSFERED OUT.  CM CALLED [facility] [phone] AND SPOKE WITH 
HOUSE SUPERVISOR CRESCENCIO STATES PT CAN TRANSFER THERE COVID POSITIVE AND CAN 
HAVE SURGERY WITH EXTRA PRECAUTIONS TAKEN.  [facility] HOSPITALIST WILL CALL DR PETERSON 
FOR DOC TO DOC.  CM CALLED PT IN ROOM TO UPDATE CONDITION AND ANSWERED 
QUESTIONS.  
  
: ARLENE SNEED MSN,RN,CM
DCP- Discharge Planning
 
Updated by [id number]: Arlene Sneed on 20   8:16 am CT
 
Patient Name: NAWAF EISENBERG                                     
Admission Status: ER   
Accout number: [id number]                              
Admission Date: 09-   
: 1955                                                        
Admission Diagnosis:SEPSIS, UNSPECIFIED ORGANISM   
Attending: ABDIEL PETERSON                                                
Current LOS:  6   
  
Anticipated DC Date:    
Planned Disposition: Home or Self Care   
Primary Insurance: [employer]NLIFE   
  
  
Discharge Planning Comments: CM called pt room and spoke with patient to 
complete initial dc planning assessment.  CM educated patient on the CM role 
and verbal consent given by patient to complete assessment.  CM verified 
patient's address, phone number, and emergency contact phone numbers.  
Patient lives at home with family.  At discharge patient plans to return home 
and feels this is a safe discharge.  CM discussed availability of home health,
 rehab services, and medical equipment. The patient is currently on 
continuous oxygen and may need oxygen at discharge.  Pt verbalized choice for 
[facility]. Patient denies other known discharge needs at this time. 
Transportation provider at discharge will be his wife. CM will continue to 
follow and will assist as needed with dc plans/needs.    
  
: Arlene Sneed
 DCPIA - Discharge Planning Initial Assessment
 
Updated by [id number]: Arlene Sneed on 20   8:39 pm
*  Is the patient Alert and Oriented?
Yes
*  How many steps to enter\exit or inside your home? 0/0 *  PCP NICHOLAS *  Pharmacy [facility]
*  Preadmission Environment
Home with Family
*  ADLs
Independent
*  Equipment
Cane
*  List name and contact numbers for known caregivers / representatives who 
currently or will assist patient after discharge:
SOLOMON WIFE [phone]  SYMONE -[phone]
 
*  Verbal permission to speak to the caregivers and representatives has been 
obtained from the patient.
Yes
*  Community resources currently utilized
None
*  Additional services required to return to the preadmission environment?
Yes
*  Can the patient safely return to the preadmission environment?
Yes
 
*  Has this patient been hospitalized within the prior 30 days at any 
hospital?
No
 
 
 
 
 
 
 
Last DP export: 20  12:21 p
Patient Name: NAWAF EISENBERG
Encounter #: [id number]
Page [id number]
 
 
 
 
 
Electronically Signed by EVAN ALDANA on 20 at 1335
 
 
 
 
 
 
**All edits/amendments must be made on the electronic document**
 
DICTATION DATE: 20 1335     : PARAG  20 1335     
RPT#: [id number]                                DC DATE:        
                                               STATUS: ADM IN  
[facility]
 [place] [address]
***END OF REPORT***

## 2020-09-28 NOTE — MORECARE
CASE MANAGEMENT DISCHARGE SUMMARY
 
 
PATIENT: NAWAF EISENBERG                    UNIT: [id number]
ACCOUNT#: [id number]                       ADM DATE: 09/15/20
AGE: 65     : 51  SEX: M            ROOM/BED: D.2140    
AUTHOR: EVAN ALDANA                             PHYSICIAN:                               
 
REFERRING PHYSICIAN: ABDIEL PETERSON MD            
DATE OF SERVICE: 20
Discharge Plan
 
 
Patient Name: NAWAF EISENBERG
Facility: [facility]:[place]
Encounter #: [id number]
Medical Record #: [id number]
: 1955
Planned Disposition: Home or Self Care
Anticipated Discharge Date: 
 
Discharge Date: 
Expected LOS: 
Initial Reviewer: [id number]
Initial Review Date: 2020
Generated: 20   1:35 pm 
Comments
 
DCP- Discharge Planning
 
Updated by [id number]: Arlene Sneed on 20  11:32 am CT
CM SPOKE WITH DR TOMLINSON ABOUT PT CONDITION, AND DR PETERSON'S RECOMMENDATION FOR 
TRANSFER FOR UROLOGY.  DR TOMLINSON CALLED DR ALONZO WHO STATED TO HAVE PT 
TRANSFERED OUT.  CM CALLED [facility] [phone] AND SPOKE WITH 
HOUSE SUPERVISOR CRESCENCIO STATES PT CAN TRANSFER THERE COVID POSITIVE AND CAN 
HAVE SURGERY WITH EXTRA PRECAUTIONS TAKEN.  [facility] HOSPITALIST WILL CALL DR PETERSON 
FOR DOC TO DOC.  CM CALLED PT IN ROOM TO UPDATE CONDITION AND ANSWERED 
QUESTIONS.  
  
: ARLENE SNEED MSN,RN,CM
DCP- Discharge Planning
 
Updated by [id number]: Arlene Sneed on 20   8:16 am CT
Patient Name: NAWAF EISENBERG                                     
Admission Status: ER   
Accout number: [id number]                              
Admission Date: 09-   
: 1955                                                        
Admission Diagnosis:SEPSIS, UNSPECIFIED ORGANISM   
Attending: ABDIEL PETERSON                                                
 
Current LOS:  6   
  
Anticipated DC Date:    
Planned Disposition: Home or Self Care   
Primary Insurance: B[employer]NLIFE   
  
  
Discharge Planning Comments: CM called pt room and spoke with patient to 
complete initial dc planning assessment.  CM educated patient on the CM role 
and verbal consent given by patient to complete assessment.  CM verified 
patient's address, phone number, and emergency contact phone numbers.  
Patient lives at home with family.  At discharge patient plans to return home 
and feels this is a safe discharge.  CM discussed availability of home health,
 rehab services, and medical equipment. The patient is currently on 
continuous oxygen and may need oxygen at discharge.  Pt verbalized choice for 
[facility]. Patient denies other known discharge needs at this time. 
Transportation provider at discharge will be his wife. CM will continue to 
follow and will assist as needed with dc plans/needs.    
  
: Arlene Sneed
 DCPIA - Discharge Planning Initial Assessment
 
Updated by [id number]: Arlene Sneed on 20   8:39 pm
*  Is the patient Alert and Oriented?
Yes
*  How many steps to enter\exit or inside your home? 0/0 *  PCP NICHOLAS *  Pharmacy [facility]
*  Preadmission Environment
Home with Family
*  ADLs
Independent
*  Equipment
Cane
*  List name and contact numbers for known caregivers / representatives who 
currently or will assist patient after discharge:
SOLOMON WIFE [phone]  SYMONE -[phone]
 
*  Verbal permission to speak to the caregivers and representatives has been 
obtained from the patient.
Yes
*  Community resources currently utilized
None
*  Additional services required to return to the preadmission environment?
Yes
*  Can the patient safely return to the preadmission environment?
Yes
*  Has this patient been hospitalized within the prior 30 days at any 
hospital?
No
 
 
 
 
 
 
 
 
Last DP export: 20   1:53 p
Patient Name: NAWAF EISENBERG
Encounter #: [id number]
Page [id number]
 
 
 
 
 
Electronically Signed by EVAN ALDANA on 20 at 1235
 
 
 
 
 
 
**All edits/amendments must be made on the electronic document**
 
DICTATION DATE: 20 1235     : PARAG  20 1235     
RPT#: [id number]                                DC DATE:        
                                               STATUS: ADM IN  
[facility]
 [place] [address]
***END OF REPORT***

## 2020-09-28 NOTE — NUR
DISCONNECTED PT SO HE COULD GO TO THE BATHROOM. ONCE PT WAS BACK IN BED, UNIT
OF PRBCS HUNG. VITAL SIGNS STABLE. PT DENIES ANY NEEDS AT THIS TIME. CALL
LIGHT IN REACH, NAD NOTED, WILL CONTINUE TO MONITOR.

## 2020-09-28 NOTE — MORECARE
CASE MANAGEMENT DISCHARGE SUMMARY
 
 
PATIENT: NAWAF EISENBERG                    UNIT: [id number]
ACCOUNT#: [id number]                       ADM DATE: 09/15/20
AGE: 65     : 51  SEX: M            ROOM/BED: D.2140    
AUTHOR: KATYA,DOC                             PHYSICIAN:                               
 
REFERRING PHYSICIAN: ABDIEL PETERSON MD            
DATE OF SERVICE: 20
Discharge Plan
 
 
Patient Name: NAWAF EISENBERG
Facility: [facility]:[place]
Encounter #: [id number]
Medical Record #: [id number]
: 1955
Planned Disposition: Home or Self Care
Anticipated Discharge Date: 
 
Discharge Date: 
Expected LOS: 
Initial Reviewer: [id number]
Initial Review Date: 2020
Generated: 20   5:48 pm 
Comments
 
DCP- Discharge Planning
 
Updated by [id number]: Arlene Sneed on 20   3:41 pm CT
CM attempted to call [facility] supervisor for [facility] at [phone] 
about discontinuing transfer.  Called 2 times, and was able to get ahold of 
her.  Updated status of transfer.  
  
Arlene Sneed
DCP- Discharge Planning
 
Updated by [id number]: Arlene Sneed on 20   3:24 pm CT
CM received call from Arlene SOUZA states that she spoke with Gretchen at the 
Urology clinic who states Dr Shane is seeing pts and can transfer to [place].  
CM called Gretchen at Urology services at  [phone].  CM spoke with 
Gretchen and Dr. Shane via speaker phone.  Gretchen states he is in the clinic 
this week and not on hospital call.  [facility] does not have urology 
coverage this week per Gretchen at Urology clinic. CM updated condition, 
imaging, and history.  Dr. Shane states to have the nurse remove the Phillips and 
insert a coude.  States if he continues to have hydro nephrosis then send him 
to [place] where he will see him.  Updated condition with the nurse, Charla.
DCP- Discharge Planning
 
 
Updated by [id number]: Arlene Sneed on 20  12:29 pm CT
CM called Yris at [facility] to see if pt can be transferred there 
for urology services.  Yris states that she does not have urology coverage 
there this week and could not accept transfer.    
Arlene Sneed
DCP- Discharge Planning
 
Updated by [id number]: Arlene Sneed on 20  11:32 am CT
CM SPOKE WITH DR TOMLINSON ABOUT PT CONDITION, AND DR PETERSON'S RECOMMENDATION FOR 
TRANSFER FOR UROLOGY.  DR TOMLINSON CALLED DR ALONZO WHO STATED TO HAVE PT 
TRANSFERED OUT.  CM CALLED [facility] [phone] AND SPOKE WITH 
HOUSE SUPERVISOR CRESCENCIO STATES PT CAN TRANSFER THERE COVID POSITIVE AND CAN 
HAVE SURGERY WITH EXTRA PRECAUTIONS TAKEN.  [facility] HOSPITALIST WILL CALL DR PETERSON 
FOR DOC TO DOC.  CM CALLED PT IN ROOM TO UPDATE CONDITION AND ANSWERED 
QUESTIONS.  
  
: ARLENE SNEED MSN,RN,CM
DCP- Discharge Planning
 
Updated by [id number]: Arlene Sneed on 20   8:16 am CT
Patient Name: NAWAF EISENBERG                                     
Admission Status: ER   
Accout number: [id number]                              
Admission Date: 09-   
: 1955                                                        
Admission Diagnosis:SEPSIS, UNSPECIFIED ORGANISM   
Attending: ABDIEL PETERSON                                                
Current LOS:  6   
  
Anticipated DC Date:    
Planned Disposition: Home or Self Care   
Primary Insurance: [employer]IFE   
  
  
Discharge Planning Comments: CM called pt room and spoke with patient to 
complete initial dc planning assessment.  CM educated patient on the CM role 
and verbal consent given by patient to complete assessment.  CM verified 
patient's address, phone number, and emergency contact phone numbers.  
Patient lives at home with family.  At discharge patient plans to return home 
and feels this is a safe discharge.  CM discussed availability of home health,
 rehab services, and medical equipment. The patient is currently on 
continuous oxygen and may need oxygen at discharge.  Pt verbalized choice for 
[facility]. Patient denies other known discharge needs at this time. 
Transportation provider at discharge will be his wife. CM will continue to 
follow and will assist as needed with dc plans/needs.    
  
: Arlene Sneed
 DCPIA - Discharge Planning Initial Assessment
 
Updated by [id number]: Arlene Sneed on 20   8:39 pm
*  Is the patient Alert and Oriented?
Yes
 
*  How many steps to enter\exit or inside your home? 0/0 *  PCP NICHOLAS *  Pharmacy [facility]
*  Preadmission Environment
Home with Family
*  ADLs
Independent
*  Equipment
Cane
*  List name and contact numbers for known caregivers / representatives who 
currently or will assist patient after discharge:
SOLOMON WIFE [phone]  SYMONE -[phone]
 
*  Verbal permission to speak to the caregivers and representatives has been 
obtained from the patient.
Yes
*  Community resources currently utilized
None
*  Additional services required to return to the preadmission environment?
Yes
*  Can the patient safely return to the preadmission environment?
Yes
*  Has this patient been hospitalized within the prior 30 days at any 
hospital?
No
 
 
 
 
 
 
 
Last DP export: 20   3:28 p
Patient Name: NAWAF EISENBERG
Encounter #: [id number]
Page [phone]
 
 
 
 
 
Electronically Signed by EVAN ALDANA on 20 at 1648
 
 
 
 
 
 
**All edits/amendments must be made on the electronic document**
 
DICTATION DATE: 20     : PARAG  20     
RPT#: [id number]                                DC DATE:        
                                               STATUS: ADM IN  
[facility]
 [place] [address]
***END OF REPORT***

## 2020-09-29 VITALS — DIASTOLIC BLOOD PRESSURE: 61 MMHG | SYSTOLIC BLOOD PRESSURE: 104 MMHG

## 2020-09-29 VITALS — DIASTOLIC BLOOD PRESSURE: 64 MMHG | SYSTOLIC BLOOD PRESSURE: 109 MMHG

## 2020-09-29 VITALS — DIASTOLIC BLOOD PRESSURE: 67 MMHG | SYSTOLIC BLOOD PRESSURE: 120 MMHG

## 2020-09-29 VITALS — DIASTOLIC BLOOD PRESSURE: 65 MMHG | SYSTOLIC BLOOD PRESSURE: 112 MMHG

## 2020-09-29 VITALS — SYSTOLIC BLOOD PRESSURE: 113 MMHG | DIASTOLIC BLOOD PRESSURE: 70 MMHG

## 2020-09-29 LAB
ANION GAP SERPL CALC-SCNC: 16.1 MMOL/L (ref 8–16)
BASOPHILS NFR BLD AUTO: 0 % (ref 0–2)
BUN SERPL-MCNC: 57 MG/DL (ref 7–18)
CALCIUM SERPL-MCNC: 7.2 MG/DL (ref 8.5–10.1)
CHLORIDE SERPL-SCNC: 108 MMOL/L (ref 98–107)
CO2 SERPL-SCNC: 16.1 MMOL/L (ref 21–32)
CREAT SERPL-MCNC: 4.7 MG/DL (ref 0.6–1.3)
EOSINOPHIL NFR BLD: 4.2 % (ref 0–7)
ERYTHROCYTE [DISTWIDTH] IN BLOOD BY AUTOMATED COUNT: 14.4 % (ref 11.5–14.5)
GLUCOSE SERPL-MCNC: 79 MG/DL (ref 74–106)
HCT VFR BLD CALC: 25.3 % (ref 42–54)
HGB BLD-MCNC: 7.9 G/DL (ref 13.5–17.5)
IMM GRANULOCYTES NFR BLD: 1.3 % (ref 0–5)
LYMPHOCYTES NFR BLD AUTO: 11.3 % (ref 15–50)
MCH RBC QN AUTO: 29.5 PG (ref 26–34)
MCHC RBC AUTO-ENTMCNC: 31.2 G/DL (ref 31–37)
MCV RBC: 94.4 FL (ref 80–100)
MONOCYTES NFR BLD: 12.2 % (ref 2–11)
NEUTROPHILS NFR BLD AUTO: 71 % (ref 40–80)
OSMOLALITY SERPL CALC.SUM OF ELEC: 286 MOSM/KG (ref 275–300)
PLATELET # BLD: 158 10X3/UL (ref 130–400)
PMV BLD AUTO: 9.5 FL (ref 7.4–10.4)
POTASSIUM SERPL-SCNC: 4.2 MMOL/L (ref 3.5–5.1)
RBC # BLD AUTO: 2.68 10X6/UL (ref 4.2–6.1)
SODIUM SERPL-SCNC: 136 MMOL/L (ref 136–145)
WBC # BLD AUTO: 9 10X3/UL (ref 4.8–10.8)

## 2020-09-29 NOTE — NUR
HERE FOR ROUNDS.  WANTS PATIENT TRANSFERRED THIS AM TO [facility] AS
HE GAVE DISCHARGE ORDERS YESTERDAY. PATIENT TRANSFERRING FOR UROLOGY CARE.
 STATES TO TRANSFER PATIENT AND HE CAN RECIEVE ONE UNIT OF BLOOD AT [facility]
IF NEPHROLOGY THINKS THAT PATIENT NEEDS IT AS HE RECIEVED ONE UNIT YESTERDAY
PER NEPHROLOGY.

## 2020-09-29 NOTE — NUR
REPORT CALLED TO GASPER ZAZUETA CHARGE NURSE AT [phone]. PATIENT TO BE
TRANSFERRED TO [facility]

## 2020-09-29 NOTE — NUR
DR PETERSON'S NURSE, JAMES, ADVISED THAT PT TO BE TRANSFERRED TO [facility] ROOM 446.
SHE WILL NOTIFY DR PETERSON.

## 2020-09-29 NOTE — MORECARE
CASE MANAGEMENT DISCHARGE SUMMARY
 
 
PATIENT: NAWAF EISENBERG                    UNIT: [id number]
ACCOUNT#: [id number]                       ADM DATE: 09/15/20
AGE: 65     : [birth year]  SEX: M            ROOM/BED: D.2140    
AUTHOR: KATYADOC                             PHYSICIAN:                               
 
REFERRING PHYSICIAN: ABDIEL OSBORNE MD            
DATE OF SERVICE: 20
Discharge Plan
 
 
Patient Name: NAWAF EISENBERG
Facility: [facility]:[place]
Encounter #: [id number]
Medical Record #: [id number]
: 1955
Planned Disposition: Home or Self Care
Anticipated Discharge Date: 
 
Discharge Date: 
Expected LOS: 
Initial Reviewer: [id number]
Initial Review Date: 2020
Generated: 20   3:45 pm 
Comments
 
DCP- Discharge Planning
 
Updated by [id number]: Arlene Sneed on 20   1:38 pm CT
Patient Name:  NAWAF EISENBERG   
Encounter No:  [id number]   
:  1955   
Primary Insurance:  [employer]  
Anticipated DC Date:    
Planned Disposition:  Home or Self Care  
External Planned Provider: :   
  
  
DCP follow-up note: CM received call from Aly at [facility].  States 
Dr Patrick has accepted the patient. He will go to bed 446, and nursing can 
call report to [phone].  The pt will need to be transported by EMS. 
Patient and family in agreement with discharge plan. No changes to plan. Case 
management will follow and assist as needed.  
  
Arlene Sneed
DCP- Discharge Planning
 
Updated by [id number]: Arlene Sneed on 20   7:43 am CT
 
Patient Name: NAWAF EISENBERG                                     
Admission Status: ER   
Accout number: [id number]                              
Admission Date: 09-   
: 1955                                                        
Admission Diagnosis:SEPSIS, UNSPECIFIED ORGANISM   
Attending: ABDIEL OSBORNE                                                
Current LOS:  14   
  
Anticipated DC Date:    
Planned Disposition: Home or Self Care   
Primary Insurance: [employer]IFE   
  
  
Discharge Planning Comments: CM spoke Dr Osborne about transfer.  Dr Osborne stated 
to transfer the pt to [facility].  Stated he spoke with Dr Rangel, and urololgy and 
nephrology are expecting him.  Cm called [facility] supervisor for [facility] at 
[phone] about transfer.  Stated there is not a bed at this time, but 
should have one as soon as patients are discharged.  Faxed transfer back 
agreement and provided contact information for return call.     
  
: Arlene Sneed
DCP- Discharge Planning
 
Updated by [id number]: Arlene Sneed on 20   3:41 pm CT
[facility] at [phone] 
about discontinuing transfer.  Called 2 times, and was able to get ahold of 
her.  Updated status of transfer.  
  
Arlene Sneed
DCP- Discharge Planning
 
Updated by [id number]: Arlene Sneed on 20   3:24 pm CT
CM received call from Arlene SOUZA states that she spoke with Gretchen at the 
Urology clinic who states Dr Shane is seeing pts and can transfer to [place].  
CM called Gretchen at Urology services at  [phone].  CM spoke with 
Gretchen and Dr. Shane via speaker phone.  Gretchen states he is in the clinic 
this week and not on hospital call.  [facility] does not have urology 
coverage this week per Gretchen at Urology clinic. CM updated condition, 
imaging, and history.  Dr. Shane states to have the nurse remove the Phillips and 
insert a coude.  States if he continues to have hydro nephrosis then send him 
to [place] where he will see him.  Updated condition with the nurse, Charla.
DCP- Discharge Planning
 
Updated by [id number]: Arlene Sneed on 20  12:29 pm CT
CM called Yris at [facility] to see if pt can be transferred there 
for urology services.  Yris states that she does not have urology coverage 
there this week and could not accept transfer.    
Arlene Sneed
DCP- Discharge Planning
 
Updated by [id number]: Arlene Sneed on 20  11:32 am CT
 
CM SPOKE WITH DR TOMLINSON ABOUT PT CONDITION, AND DR OSBORNE'S RECOMMENDATION FOR 
TRANSFER FOR UROLOGY.  DR TOMLINSON CALLED DR ALONZO WHO STATED TO HAVE PT 
TRANSFERED OUT.  ADRIANO CALLED [facility] [phone] AND SPOKE WITH 
HOUSE SUPERVISOR CRESCENCIO STATES PT CAN TRANSFER THERE COVID POSITIVE AND CAN 
HAVE SURGERY WITH EXTRA PRECAUTIONS TAKEN.  [facility] HOSPITALIST WILL CALL DR OSBORNE 
FOR DOC TO DOC.  CM CALLED PT IN ROOM TO UPDATE CONDITION AND ANSWERED 
QUESTIONS.  
  
: ARLENE SNEED MSN,RN,CM
DCP- Discharge Planning
 
Updated by [id number]: Arlene Sneed on 20   8:16 am CT
Patient Name: NAWAF EISENBERG                                     
Admission Status: ER   
Accout number: [id number]                              
Admission Date: 09-   
: 1955                                                        
Admission Diagnosis:SEPSIS, UNSPECIFIED ORGANISM   
Attending: ABDIEL OSBORNE                                                
Current LOS:  6   
  
Anticipated DC Date:    
Planned Disposition: Home or Self Care   
Primary Insurance: [employer]   
  
  
Discharge Planning Comments: CM called pt room and spoke with patient to 
complete initial dc planning assessment.  CM educated patient on the CM role 
and verbal consent given by patient to complete assessment.  CM verified 
patient's address, phone number, and emergency contact phone numbers.  
Patient lives at home with family.  At discharge patient plans to return home 
and feels this is a safe discharge.  CM discussed availability of home health,
 rehab services, and medical equipment. The patient is currently on 
continuous oxygen and may need oxygen at discharge.  Pt verbalized choice for 
[facility]. Patient denies other known discharge needs at this time. 
Transportation provider at discharge will be his wife. CM will continue to 
follow and will assist as needed with dc plans/needs.    
  
: Arlene Sneed
 DCPIA - Discharge Planning Initial Assessment
 
Updated by [id number]: Arlene Sneed on 20   8:39 pm
*  Is the patient Alert and Oriented?
Yes
*  How many steps to enter\exit or inside your home? 0/0 *  PCP NICHOLAS *  Pharmacy [facility]
*  Preadmission Environment
Home with Family
*  ADLs
Independent
*  Equipment
Cane
*  List name and contact numbers for known caregivers / representatives who 
 
currently or will assist patient after discharge:
SOLOMON WIFE [phone]  SYMONE [phone]
 
*  Verbal permission to speak to the caregivers and representatives has been 
obtained from the patient.
Yes
*  Community resources currently utilized
None
*  Additional services required to return to the preadmission environment?
Yes
*  Can the patient safely return to the preadmission environment?
Yes
*  Has this patient been hospitalized within the prior 30 days at any 
hospital?
No
 
 
 
 
 
 
 
Last DP export: 20   7:46 a
Patient Name: NAWAF EISENBERG
Encounter #: [id number]
Page [id number]
 
 
 
 
 
Electronically Signed by EVAN ALDANA on 20 at 1446
 
 
 
 
 
 
**All edits/amendments must be made on the electronic document**
 
DICTATION DATE: 20     : PARAG  20     
RPT#: [id number]                                DC DATE:        
                                               STATUS: ADM IN  
[facility]
 [place] [address]
***END OF REPORT***

## 2020-09-29 NOTE — MORECARE
CASE MANAGEMENT DISCHARGE SUMMARY
 
 
PATIENT: NAWAF EISENBERG                    UNIT: [id number]
ACCOUNT#: [id number]                       ADM DATE: 09/15/20
AGE: 65     : 51  SEX: M            ROOM/BED: D.2140    
AUTHOR: KATYADOC                             PHYSICIAN:                               
 
REFERRING PHYSICIAN: ABDIEL OSBORNE MD            
DATE OF SERVICE: 20
Discharge Plan
 
 
Patient Name: NAWAF EISENBERG
Facility: [facility]:[place]
Encounter #: [id number]
Medical Record #: [id number]
: 1955
Planned Disposition: Home or Self Care
Anticipated Discharge Date: 
 
Discharge Date: 
Expected LOS: 
Initial Reviewer: [id number]
Initial Review Date: 2020
Generated: 20   9:46 am 
Comments
 
DCP- Discharge Planning
 
Updated by [id number]: Arlene Sneed on 20   7:43 am CT
Patient Name: NAWAF EISENBERG                                     
Admission Status: ER   
Accout number: [id number]                              
Admission Date: 09-   
: 1955                                                        
Admission Diagnosis:SEPSIS, UNSPECIFIED ORGANISM   
Attending: ABDIEL OSBORNE                                                
Current LOS:  14   
  
Anticipated DC Date:    
Planned Disposition: Home or Self Care   
Primary Insurance: [employer]   
  
  
Discharge Planning Comments: CM spoke Dr Osborne about transfer.  Dr Osborne stated 
to transfer the pt to [facility].  Stated he spoke with Dr Rangel, and urololgy and 
nephrology are expecting him.  Cm called [facility] supervisor for [facility] at 
[phone] about transfer.  Stated there is not a bed at this time, but 
should have one as soon as patients are discharged.  Faxed transfer back 
 
agreement and provided contact information for return call.     
  
: Arlene Sneed
DCP- Discharge Planning
 
Updated by [id number]: Arlene Sneed on 20   3:41 pm CT
CM attempted to call [facility] supervisor for [facility] at [phone] 
about discontinuing transfer.  Called 2 times, and was able to get ahold of 
her.  Updated status of transfer.  
  
Arlene Sneed
DCP- Discharge Planning
 
Updated by [id number]: Arlene Sneed on 20   3:24 pm CT
CM received call from Arlene SOUZA states that she spoke with Gretchen at the 
Urology clinic who states Dr Shane is seeing pts and can transfer to [place].  
CM called Gretchen at Urology services at  [phone].  CM spoke with 
Gretchen and Dr. Shane via speaker phone.  Gretchen states he is in the clinic 
this week and not on hospital call.  [facility] does not have urology 
coverage this week per Gretchen at Urology clinic. CM updated condition, 
imaging, and history.  Dr. Shane states to have the nurse remove the Phillips and 
insert a coude.  States if he continues to have hydro nephrosis then send him 
to [place] where he will see him.  Updated condition with the nurse, Charla.
DCP- Discharge Planning
 
Updated by [id number]: Arlene Sneed on 20  12:29 pm CT
CM called Yris at [facility] to see if pt can be transferred there 
for urology services.  Yris states that she does not have urology coverage 
there this week and could not accept transfer.    
Arlene Sneed
DCP- Discharge Planning
 
Updated by [id number]: Arlene Sneed on 20  11:32 am CT
CM SPOKE WITH DR TOMLINSON ABOUT PT CONDITION, AND DR OSBORNE'S RECOMMENDATION FOR 
TRANSFER FOR UROLOGY.  DR TOMLINSON CALLED DR ALONZO WHO STATED TO HAVE PT 
TRANSFERED OUT.  CM CALLED [facility] [phone] AND SPOKE WITH 
HOUSE SUPERVISOR CRESCENCIO STATES PT CAN TRANSFER THERE COVID POSITIVE AND CAN 
HAVE SURGERY WITH EXTRA PRECAUTIONS TAKEN.  [facility] HOSPITALIST WILL CALL DR OSBORNE 
FOR DOC TO DOC.  CM CALLED PT IN ROOM TO UPDATE CONDITION AND ANSWERED 
QUESTIONS.  
  
: ARLENE SNEED MSN,RN,CM
DCP- Discharge Planning
 
Updated by [id number]: Arlene Sneed on 20   8:16 am CT
Patient Name: NAWAF EISENBERG                                     
Admission Status: ER   
Accout number: [id number]                              
Admission Date: 09-   
: 1955                                                        
Admission Diagnosis:SEPSIS, UNSPECIFIED ORGANISM   
Attending: ABDIEL OSBORNE                                                
 
Current LOS:  6   
  
Anticipated DC Date:    
Planned Disposition: Home or Self Care   
Primary Insurance: [employer]   
  
  
Discharge Planning Comments: CM called pt room and spoke with patient to 
complete initial dc planning assessment.  CM educated patient on the CM role 
and verbal consent given by patient to complete assessment.  CM verified 
patient's address, phone number, and emergency contact phone numbers.  
Patient lives at home with family.  At discharge patient plans to return home 
and feels this is a safe discharge.  CM discussed availability of home health,
 rehab services, and medical equipment. The patient is currently on 
continuous oxygen and may need oxygen at discharge.  Pt verbalized choice for 
[facility]. Patient denies other known discharge needs at this time. 
Transportation provider at discharge will be his wife. CM will continue to 
follow and will assist as needed with dc plans/needs.    
  
: Arlene Sneed
 DCPIA - Discharge Planning Initial Assessment
 
Updated by [id number]: Arlene Sneed on 20   8:39 pm
*  Is the patient Alert and Oriented?
Yes
*  How many steps to enter\exit or inside your home? 0/0 *  PCP NICHOLAS *  Pharmacy [facility]
*  Preadmission Environment
Home with Family
*  ADLs
Independent
*  Equipment
Cane
*  List name and contact numbers for known caregivers / representatives who 
currently or will assist patient after discharge:
SOLOMON WIFE [phone]  SYMONE [phone]
 
*  Verbal permission to speak to the caregivers and representatives has been 
obtained from the patient.
Yes
*  Community resources currently utilized
None
*  Additional services required to return to the preadmission environment?
Yes
*  Can the patient safely return to the preadmission environment?
Yes
*  Has this patient been hospitalized within the prior 30 days at any 
hospital?
No
 
 
 
 
 
 
 
 
Last DP export: 20   3:48 p
Patient Name: NAWAF EISENBERG
Encounter #: [id number]
Page [id number]
 
 
 
 
 
Electronically Signed by EVAN ALDANA on 20 at 0846
 
 
 
 
 
 
**All edits/amendments must be made on the electronic document**
 
DICTATION DATE: 20     : PARAG  20     
RPT#: [id number]                                DC DATE:        
                                               STATUS: ADM IN  
[facility]
[address] [place] [address]
***END OF REPORT***

## 2020-09-29 NOTE — NUR
Nutrition Follow-up:
Pt in droplet isolation; covid-19+. Chart reviewed. Eating well.
Diet: Regular
PO intake: %
WT: 166# (9/16)
Labs noted: Ca 7.2
Meds noted: Senokot, Florajen, Pepcid, folate, zinc sulfate, vit D, vit C,
            1/2NS @ 125
-RD following.

## 2020-09-29 NOTE — OP
PATIENT NAME:  NAWAF EISENBERG                          MEDICAL RECORD: [id number]
:[birth year]                                             LOCATION:D.M2     D.2140
                                                         ADMISSION DATE:09/15/20
SURGEON:  ARNOLDO SANTAMARIA MD          
 
 
DATE OF OPERATION:  2020
 
PREOPERATIVE DIAGNOSES:
1.  Acute renal failure.
2.  Dehydration.
3.  COVID-19 positive.
 
POSTOPERATIVE DIAGNOSES:
1.  Acute renal failure.
2.  Dehydration.
3.  COVID-19 positive.
 
PROCEDURE:  Right IJ 12.5 cm Trialysis catheter placement.
 
SURGEON:  Arnoldo Santamaria MD
 
REPORT OF PROCEDURE:  The patient's right neck was prepped and draped in sterile
fashion.  A 5 cc of 1% lidocaine with epinephrine was infused into the
surrounding tissues.  Using ultrasound guidance, a needle was used to cannulate
the right internal jugular vein and a guidewire was advanced with ease.  Over
this wire, a dilator was placed followed by the Trialysis catheter.  The
catheter aspirated nonpulsatile dark blood and flushed easily in all 3 ports. 
This was sutured into place with 3-0 nylons and dressed appropriately.
 
COMPLICATIONS:  None.
 
CONDITION:  Stable.
 
ANESTHESIA:  Local.
 
BLOOD LOSS:  Minimal.
 
Procedure done in the ICU at the bedside.
 
TRANSINT:[id number] Voice Confirmation ID: [id number] DOCUMENT ID: [id number]
                                           
                                           ARNOLDO SANTAMARIA MD          
 
 
 
Electronically Signed by ARNOLDO SANTAMARIA on 20 at 0949
 
 
 
CC:                                                             [id number]
DICTATION DATE: 20 1321     :     20 1622      ADM IN  
                                                                              
[id number] [place] [address]

## 2020-09-29 NOTE — NUR
[facility] HERE TRANSPORT AT THIS TIME. NO DISTRESS UPON DISCHARGING TO [facility].
PATIENT LEFT WITH ALL PERSONAL BELONGINGS.

## 2020-09-29 NOTE — NUR
SPOKE WITH PATIENT AND PATIENT CONFIRMS THAT HIS QUIROGA CATHETER WAS CHANGED
YESTERDAY AFTERNOON AND A COUDE WAS PLACED. URINE IS CLEAR YELLOW TO DRAINAGE
BAG VIA GRAVITY.

## 2020-09-29 NOTE — NUR
0429--LYING IN BED AWAKE, ALERT, ORIENTED, IVF TURNED OFF FOR 10 MINUTES THEN
FLUSHED TRIALLYSIS NURSING PORT W/101CC'S NS AND WITHDREW 10CC'S BLOOD FOR
WASTE--THEN WITHDREW 10CC'S BLOOD FOR AM LAB ORDERS--THEN TURNED IVF BACK
ON--PT TOLERATED ALL WELL--DENIES ANY NEEDS AT THIS TIME.

## 2020-09-29 NOTE — NUR
CONTINUE TO WAIT ARRIVAL OF [facility] FOR TRANSPORT. TOLERATING IV FLUIDS WELL.
NO DISTRESS.

## 2023-04-14 NOTE — [facility]
CASE MANAGEMENT DISCHARGE SUMMARY
 
 
PATIENT: NAWAF EISENBERG                    UNIT: [id number]
ACCOUNT#: [id number]                       ADM DATE: 09/15/20
AGE: 65     : [birth year]  SEX: M            ROOM/BED: D.2140    
AUTHOR: EVAN ALDANA                             PHYSICIAN:                               
 
REFERRING PHYSICIAN: ABDIEL OSBORNE MD            
DATE OF SERVICE: 20
Discharge Plan
 
 
Patient Name: NAWAF EISENBERG
Facility: [facility]
Encounter #: [id number]
Medical Record #: [id number]
: 1955
Planned Disposition: Other Type of Facility
Anticipated Discharge Date: 20
 
Discharge Date: 2020
Expected LOS: 14
Initial Reviewer: [id number]
Initial Review Date: 2020
Generated: 20  11:14 am 
Comments
 
DCP- Discharge Planning
 
Updated by [id number]: Arlene Sneed on 20   1:38 pm CT
Patient Name:  NAWAF EISENBERG   
Encounter No:  [id number]   
:  1955   
Primary Insurance:  [employer]  
Anticipated DC Date:    
Planned Disposition:  Home or Self Care  
External Planned Provider: :   
  
  
DCP follow-up note: CM received call from Aly at [facility].  States 
Dr Patrick has accepted the patient. He will go to bed 446, and nursing can 
call report to [phone].  The pt will need to be transported by EMS. 
Patient and family in agreement with discharge plan. No changes to plan. Case 
management will follow and assist as needed.  
  
Arlene Sneed
DCP- Discharge Planning
 
Updated by [email or web address]: Arlene Sneed on 20   7:43 am CT
 
Patient Name: NAWAF EISENBERG                                     
Admission Status: ER   
Accout number: [id number]                              
Admission Date: 09-   
: 1955                                                        
Admission Diagnosis:SEPSIS, UNSPECIFIED ORGANISM   
Attending: ABDIEL OSBORNE                                                
Current LOS:  14   
  
Anticipated DC Date:    
Planned Disposition: Home or Self Care   
Primary Insurance: [employer]   
  
  
Discharge Planning Comments: CM spoke Dr Osborne about transfer.  Dr Osborne stated 
to transfer the pt to [facility].  Stated he spoke with Dr Rangel, and urololgy and 
nephrology are expecting him.  Adriano called [facility] supervisor for [facility] at 
[phone] about transfer.  Stated there is not a bed at this time, but 
should have one as soon as patients are discharged.  Faxed transfer back 
agreement and provided contact information for return call.     
  
: Arlene Sneed
DCP- Discharge Planning
 
Updated by [email or web address]: Arlene Sneed on 20   3:41 pm CT
CM attempted to call [facility] supervisor for [facility] at [phone] 
about discontinuing transfer.  Called 2 times, and was able to get ahold of 
her.  Updated status of transfer.  
  
Arlene Sneed
DCP- Discharge Planning
 
Updated by [email or web address]: Arlene Sneed on 20   3:24 pm CT
CM received call from Arlene SOUZA states that she spoke with Gretchen at the 
Urology clinic who states Dr Shane is seeing pts and can transfer to [place].  
ADRIANO called Gretchen at Urology services at  [phone].  CM spoke with 
Gretchen and Dr. Shane via speaker phone.  Gretchen states he is in the clinic 
this week and not on hospital call.  [facility] does not have urology 
coverage this week per Gretchen at Urology clinic. CM updated condition, 
imaging, and history.  Dr. Shane states to have the nurse remove the Phillips and 
insert a coude.  States if he continues to have hydro nephrosis then send him 
to [place] where he will see him.  Updated condition with the nurse, Charla.
DCP- Discharge Planning
 
Updated by [id number]: Arlene Sneed on 20  12:29 pm CT
CM called Yris at [facility] to see if pt can be transferred there 
for urology services.  Yris states that she does not have urology coverage 
there this week and could not accept transfer.    
Arlene Sneed
DCP- Discharge Planning
 
Updated by [email or web address]: Arlene Sneed on 20  11:32 am CT
 
CM SPOKE WITH DR TOMLINSON ABOUT PT CONDITION, AND DR OSBORNE'S RECOMMENDATION FOR 
TRANSFER FOR UROLOGY.  DR TOMLINSON CALLED DR ALONZO WHO STATED TO HAVE PT 
TRANSFERED OUT.  CM CALLED [facility] [phone] AND SPOKE WITH 
HOUSE SUPERVISOR CRESCENCIO STATES PT CAN TRANSFER THERE COVID POSITIVE AND CAN 
HAVE SURGERY WITH EXTRA PRECAUTIONS TAKEN.  [facility] HOSPITALIST WILL CALL DR OSBORNE 
FOR DOC TO DOC.  CM CALLED PT IN ROOM TO UPDATE CONDITION AND ANSWERED 
QUESTIONS.  
  
: ARLENE SNEED MSN,RN,CM
DCP- Discharge Planning
 
Updated by [id number]: Arlene Sneed on 20   8:16 am CT
Patient Name: NAWAF EISENBERG                                     
Admission Status: ER   
Accout number: [id number]                              
Admission Date: 09-   
: 1955                                                        
Admission Diagnosis:SEPSIS, UNSPECIFIED ORGANISM   
Attending: ABDIEL OSBORNE                                                
Current LOS:  6   
  
Anticipated DC Date:    
Planned Disposition: Home or Self Care   
Primary Insurance: [employer]   
  
  
Discharge Planning Comments: CM called pt room and spoke with patient to 
complete initial dc planning assessment.  CM educated patient on the CM role 
and verbal consent given by patient to complete assessment.  CM verified 
patient's address, phone number, and emergency contact phone numbers.  
Patient lives at home with family.  At discharge patient plans to return home 
and feels this is a safe discharge.  CM discussed availability of home health,
 rehab services, and medical equipment. The patient is currently on 
continuous oxygen and may need oxygen at discharge.  Pt verbalized choice for 
[facility]. Patient denies other known discharge needs at this time. 
Transportation provider at discharge will be his wife. CM will continue to 
follow and will assist as needed with dc plans/needs.    
  
: Arlene Sneed
 DCPIA - Discharge Planning Initial Assessment
 
Updated by [id number]: Arlene Sneed on 20   8:39 pm
*  Is the patient Alert and Oriented?
Yes
*  How many steps to enter\exit or inside your home? 0/0 *  PCP NICHOLAS *  [facility]
*  Preadmission Environment
Home with Family
*  ADLs
Independent
*  Equipment
Cane
*  List name and contact numbers for known caregivers / representatives who 
 
currently or will assist patient after discharge:
SOLOMON WIFE [phone]  SYMONE [phone]
 
*  Verbal permission to speak to the caregivers and representatives has been 
obtained from the patient.
Yes
*  Community resources currently utilized
None
*  Additional services required to return to the preadmission environment?
Yes
*  Can the patient safely return to the preadmission environment?
Yes
*  Has this patient been hospitalized within the prior 30 days at any 
hospital?
No
 
 
 
 
 
 
 
Last DP export: 20   1:46 p
Patient Name: NAWAF EISENBERG
Encounter #: [id number]
Page 60399
 
 
 
 
 
Electronically Signed by EVAN ALDANA on 20 at 1015
 
 
 
 
 
 
**All edits/amendments must be made on the electronic document**
 
DICTATION DATE: 20 1014     : PARAG  20 1014     
RPT#: [id number]                                DC DATE:20
                                               STATUS: DIS IN  
[facility]
[address] [place] [address]
***END OF REPORT*** with patient English